# Patient Record
Sex: MALE | Race: WHITE | Employment: UNEMPLOYED | URBAN - METROPOLITAN AREA
[De-identification: names, ages, dates, MRNs, and addresses within clinical notes are randomized per-mention and may not be internally consistent; named-entity substitution may affect disease eponyms.]

---

## 2021-11-22 ENCOUNTER — APPOINTMENT (OUTPATIENT)
Dept: GENERAL RADIOLOGY | Age: 31
End: 2021-11-22
Attending: STUDENT IN AN ORGANIZED HEALTH CARE EDUCATION/TRAINING PROGRAM
Payer: COMMERCIAL

## 2021-11-22 ENCOUNTER — HOSPITAL ENCOUNTER (EMERGENCY)
Age: 31
Discharge: HOME HEALTH | End: 2021-11-22
Attending: STUDENT IN AN ORGANIZED HEALTH CARE EDUCATION/TRAINING PROGRAM
Payer: COMMERCIAL

## 2021-11-22 VITALS
WEIGHT: 85 LBS | HEIGHT: 60 IN | OXYGEN SATURATION: 100 % | DIASTOLIC BLOOD PRESSURE: 78 MMHG | HEART RATE: 92 BPM | SYSTOLIC BLOOD PRESSURE: 137 MMHG | BODY MASS INDEX: 16.69 KG/M2 | TEMPERATURE: 100 F | RESPIRATION RATE: 24 BRPM

## 2021-11-22 DIAGNOSIS — R50.9 FEVER, UNSPECIFIED FEVER CAUSE: Primary | ICD-10-CM

## 2021-11-22 LAB
COVID-19 RAPID TEST, COVR: NOT DETECTED
LACTATE BLD-SCNC: 3.67 MMOL/L (ref 0.4–2)
LIPASE SERPL-CCNC: 227 U/L (ref 73–393)
SOURCE, COVRS: NORMAL

## 2021-11-22 PROCEDURE — 83605 ASSAY OF LACTIC ACID: CPT

## 2021-11-22 PROCEDURE — 71045 X-RAY EXAM CHEST 1 VIEW: CPT

## 2021-11-22 PROCEDURE — 83690 ASSAY OF LIPASE: CPT

## 2021-11-22 PROCEDURE — 87635 SARS-COV-2 COVID-19 AMP PRB: CPT

## 2021-11-22 PROCEDURE — 99284 EMERGENCY DEPT VISIT MOD MDM: CPT

## 2021-11-23 NOTE — ED NOTES
EMERGENCY DEPARTMENT HISTORY AND PHYSICAL EXAM      Patient Name: Dmitry Martini    History of Presenting Illness     HPI:     79-year-old male with a history of intellectual disability, congenital blindness, HTN, scoliosis, cerebral palsy, epilepsy,  nonverbal at baseline, presents to the ED via EMS from a group home ([facility]) for evaluation of a fever, T-max 101 °F today. No changes to neurologic baseline per EMS report. He reportedly does not like being in beds and upon arrival to the ED he is writhing in the bed and appears uncomfortable. Patient is unable to provide us further history due to his medical condition. I called the group home to obtain more information who reports he has had a fever all day long and they were unable to control it with OTC antipyretics. I spoke with Jung Mendoza. She reports she is unable to access over his medications. PCP: No primary care provider on file. No current facility-administered medications on file prior to encounter. No current outpatient medications on file prior to encounter. Past History     Past Medical History:  Cerebral palsy, congenital blindness, HTN, epilepsy, intellectual disability    Past Surgical History:  Unknown, patient unable to provide due to medical condition    Family History:  Unknown, patient unable to provide due to medical condition    Social History:  Social History     Tobacco Use    Smoking status: Not on file    Smokeless tobacco: Not on file   Substance Use Topics    Alcohol use: Not on file    Drug use: Not on file       Allergies:  Not on File    Review of Nursing Notes: I have reviewed the relevant nursing notes that were available at the time of this entry.  Portions of the Family and Social history, as well as medications and allergies, may have been entered into my documentation by nursing or other ancillary staff; I have confirmed and may have supplemented that information to the best of my ability at the time the note was reviewed. There are some disagreements between the nursing notes and my evaluation at times. Some of the above referenced nursing documentation appears in my note after completion of my review. Review of Systems     Patient unable to provide due to medical condition. Physical Exam     General: In mild distress. Writhing around in bed. Head/Neck: Normocephalic, atraumatic. Nontender midline neck, normal ROM. EENT: PERRLA. EOM intact bilaterally. Cardiovascular: RRR, no murmurs, gallops, or rubs. Peripheral pulses normal and intact in BUE and BLE. Lungs/Chest: CTAB, no wheezing, rhonchi, or rales. Abdomen: No distention. No organomegaly. No rebound, rigidity, or guarding. Nontender. Extremities/Skin: Warm distal extremities No deformities. No edema. No rashes. Neuro: Moving all 4 extremities spontaneously, does have chronic contractures however. Nonverbal which is his baseline. Psych: Flat affect. Diagnostic Study Results     Labs -   No results found for this or any previous visit (from the past 12 hour(s)). Radiologic Studies -   XR CHEST PORT    (Results Pending)       Medical Decision Making     I am the first provider for this patient. Vital Signs- I personally reviewed and interpreted the patient's vital signs. No data found. Records Reviewed: I reviewed the patient's records to interpret any previous medical data available to me including EKGs, previous medical records, previous images, previous labs. Provider Notes (Medical Decision Making):     59-year-old male with a history of pathological disability, cerebral palsy, congenital blindness, presents to the ED via EMS for reported fever of 101 °F today at his group home. Reportedly was not controlled with antipyretics there and they called their physician who instructed them to transport him to the ER for further evaluation.      I discussed the case with Alicia Duran from her group home who reports there has been no other complaints or change in his behavior. Only the fever. Upon arrival, patient is nonverbal which is his baseline, writhing in bed which I am told he normally does. His vitals are normal and he is afebrile tonight at 99.7 °F.  Not tachycardic, tachypneic, or hypoxic. Blood pressure is normal.  Will obtain COVID-19/influenza swabs and discharge him back to his group home.     Magali Syed,   Date: 11/22/2021

## 2021-11-23 NOTE — ED NOTES
Patient orders to return to sending facility- [facility]. Report called to Bette Tam LPN et advised patient is returning to care home. [facility] is transporting.

## 2021-11-23 NOTE — DISCHARGE INSTRUCTIONS
You were brought here by EMS today for a fever at your group home that was not controlled with fever reducing medication. Upon arrival here, your vital signs including your temperature were normal.  We did obtain a COVID-19 and influenza swab. You were at your baseline per the report we received therefore we will discharge her back home with anticipatory guidance. Please return to the emergency room immediately if your fever returns or does not improve, there are changes in behavior, nausea, vomiting, or diarrhea. Your COVID-19 test was negative.

## 2021-11-23 NOTE — ED NOTES
Patient is Level 2 ALEC due to number of staff needed to obtain initial assessment, IV, blood work, swabs, safety issues and 1:1 monitoring.

## 2021-11-23 NOTE — ED NOTES
Patient from local long term care facility. He was sent for fever reported from staff. He is agitated, unable to obtain IV without 5-6 staff at bedside to assist in holding. Patient is biting at dressings, pulling off monitors. Initial VS obtained. MD at bedside for evaluation. Patient is afebrile on arrival. Blood and covid swab ordered et sent to lab. Plan to send patient back to care home. 1:1 sitter at bedside for safety. Patient is not a candidate for physical restraints due to his movements/ special need situation.

## 2021-11-23 NOTE — ED NOTES
TRANSFER - OUT REPORT:    Verbal report given to Rashid Xavier LPN) on Verónica Bowels  being transferred to [place] ) for routine progression of care       Report consisted of patients Situation, Background, Assessment and   Recommendations(SBAR). Information from the following report(s) ED Summary was reviewed with the receiving nurse. Lines:       Opportunity for questions and clarification was provided.       Patient transported with:   [employer]

## 2022-03-28 ENCOUNTER — HOSPITAL ENCOUNTER (EMERGENCY)
Age: 32
Discharge: HOME | End: 2022-03-29
Attending: EMERGENCY MEDICINE
Payer: COMMERCIAL

## 2022-03-28 VITALS
RESPIRATION RATE: 20 BRPM | OXYGEN SATURATION: 100 % | HEIGHT: 60 IN | TEMPERATURE: 97.3 F | BODY MASS INDEX: 16.69 KG/M2 | HEART RATE: 83 BPM | WEIGHT: 85 LBS | DIASTOLIC BLOOD PRESSURE: 67 MMHG | SYSTOLIC BLOOD PRESSURE: 128 MMHG

## 2022-03-28 DIAGNOSIS — R31.9 HEMATURIA, UNSPECIFIED TYPE: Primary | ICD-10-CM

## 2022-03-28 LAB
ALBUMIN SERPL-MCNC: 2.8 G/DL (ref 3.4–5)
ALBUMIN/GLOB SERPL: 1.1 {RATIO} (ref 0.8–1.7)
ALP SERPL-CCNC: 85 U/L (ref 45–117)
ALT SERPL-CCNC: 37 U/L (ref 16–61)
ANION GAP SERPL CALC-SCNC: 5 MMOL/L (ref 3–18)
APPEARANCE UR: ABNORMAL
AST SERPL-CCNC: 30 U/L (ref 10–38)
BACTERIA URNS QL MICRO: ABNORMAL /HPF
BASOPHILS # BLD: 0.1 K/UL (ref 0–0.1)
BASOPHILS NFR BLD: 1 % (ref 0–2)
BILIRUB SERPL-MCNC: 1.1 MG/DL (ref 0.2–1)
BILIRUB UR QL: ABNORMAL
BUN SERPL-MCNC: 34 MG/DL (ref 7–18)
BUN/CREAT SERPL: 35 (ref 12–20)
CALCIUM SERPL-MCNC: 9.6 MG/DL (ref 8.5–10.1)
CHLORIDE SERPL-SCNC: 108 MMOL/L (ref 100–111)
CO2 SERPL-SCNC: 28 MMOL/L (ref 21–32)
COLOR UR: ABNORMAL
CREAT SERPL-MCNC: 0.97 MG/DL (ref 0.6–1.3)
DIFFERENTIAL METHOD BLD: ABNORMAL
EOSINOPHIL # BLD: 0.2 K/UL (ref 0–0.4)
EOSINOPHIL NFR BLD: 4 % (ref 0–5)
EPITH CASTS URNS QL MICRO: ABNORMAL /LPF (ref 0–5)
ERYTHROCYTE [DISTWIDTH] IN BLOOD BY AUTOMATED COUNT: 14 % (ref 11.6–14.5)
GLOBULIN SER CALC-MCNC: 2.5 G/DL (ref 2–4)
GLUCOSE SERPL-MCNC: 92 MG/DL (ref 74–99)
GLUCOSE UR STRIP.AUTO-MCNC: NEGATIVE MG/DL
HCT VFR BLD AUTO: 27.9 % (ref 36–48)
HGB BLD-MCNC: 10.3 G/DL (ref 13–16)
HGB UR QL STRIP: ABNORMAL
IMM GRANULOCYTES # BLD AUTO: 0 K/UL (ref 0–0.04)
IMM GRANULOCYTES NFR BLD AUTO: 0 % (ref 0–0.5)
KETONES UR QL STRIP.AUTO: NEGATIVE MG/DL
LEUKOCYTE ESTERASE UR QL STRIP.AUTO: ABNORMAL
LYMPHOCYTES # BLD: 1.9 K/UL (ref 0.9–3.6)
LYMPHOCYTES NFR BLD: 39 % (ref 21–52)
MCH RBC QN AUTO: 35.4 PG (ref 24–34)
MCHC RBC AUTO-ENTMCNC: 36.9 G/DL (ref 31–37)
MCV RBC AUTO: 95.9 FL (ref 78–100)
MONOCYTES # BLD: 0.5 K/UL (ref 0.05–1.2)
MONOCYTES NFR BLD: 11 % (ref 3–10)
NEUTS SEG # BLD: 2.2 K/UL (ref 1.8–8)
NEUTS SEG NFR BLD: 45 % (ref 40–73)
NITRITE UR QL STRIP.AUTO: POSITIVE
NRBC # BLD: 0 K/UL (ref 0–0.01)
NRBC BLD-RTO: 0 PER 100 WBC
PH UR STRIP: 8 [PH] (ref 5–8)
PLATELET # BLD AUTO: 153 K/UL (ref 135–420)
PMV BLD AUTO: 9.8 FL (ref 9.2–11.8)
POTASSIUM SERPL-SCNC: 4 MMOL/L (ref 3.5–5.5)
PROT SERPL-MCNC: 5.3 G/DL (ref 6.4–8.2)
PROT UR STRIP-MCNC: >300 MG/DL
RBC # BLD AUTO: 2.91 M/UL (ref 4.35–5.65)
RBC #/AREA URNS HPF: ABNORMAL /HPF (ref 0–5)
SODIUM SERPL-SCNC: 141 MMOL/L (ref 136–145)
SP GR UR REFRACTOMETRY: 1.01 (ref 1–1.03)
UROBILINOGEN UR QL STRIP.AUTO: 0.2 EU/DL (ref 0.2–1)
WBC # BLD AUTO: 4.9 K/UL (ref 4.6–13.2)
WBC URNS QL MICRO: ABNORMAL /HPF (ref 0–4)

## 2022-03-28 PROCEDURE — 80053 COMPREHEN METABOLIC PANEL: CPT

## 2022-03-28 PROCEDURE — 87077 CULTURE AEROBIC IDENTIFY: CPT

## 2022-03-28 PROCEDURE — 74011250636 HC RX REV CODE- 250/636: Performed by: EMERGENCY MEDICINE

## 2022-03-28 PROCEDURE — 87186 SC STD MICRODIL/AGAR DIL: CPT

## 2022-03-28 PROCEDURE — 96372 THER/PROPH/DIAG INJ SC/IM: CPT

## 2022-03-28 PROCEDURE — 99284 EMERGENCY DEPT VISIT MOD MDM: CPT

## 2022-03-28 PROCEDURE — 85025 COMPLETE CBC W/AUTO DIFF WBC: CPT

## 2022-03-28 PROCEDURE — 81001 URINALYSIS AUTO W/SCOPE: CPT

## 2022-03-28 PROCEDURE — 87086 URINE CULTURE/COLONY COUNT: CPT

## 2022-03-28 RX ORDER — LEVOFLOXACIN 750 MG/1
750 TABLET ORAL
Status: DISCONTINUED | OUTPATIENT
Start: 2022-03-28 | End: 2022-03-29 | Stop reason: HOSPADM

## 2022-03-28 RX ORDER — LEVOFLOXACIN 750 MG/1
750 TABLET ORAL DAILY
Qty: 7 TABLET | Refills: 0 | Status: SHIPPED | OUTPATIENT
Start: 2022-03-28 | End: 2022-03-29 | Stop reason: SDUPTHER

## 2022-03-28 RX ORDER — MIDAZOLAM HYDROCHLORIDE 1 MG/ML
2 INJECTION, SOLUTION INTRAMUSCULAR; INTRAVENOUS ONCE
Status: COMPLETED | OUTPATIENT
Start: 2022-03-28 | End: 2022-03-28

## 2022-03-28 RX ADMIN — MIDAZOLAM 2 MG: 1 INJECTION INTRAMUSCULAR; INTRAVENOUS at 17:27

## 2022-03-28 NOTE — ED PROVIDER NOTES
EMERGENCY DEPARTMENT HISTORY AND PHYSICAL EXAM  This was created with voice recognition software and transcription errors may be present. 4:07 PM  Date: 3/28/2022  Patient Name: Emily Ocampo    History of Presenting Illness     Chief Complaint:    History Provided By:     HPI: Emily Ocampo is a 28 y.o. male with profound autism spectrum disorder who presents with hematuria patient is nonverbal, 89 pounds lives in a group home and is brought in for hematuria. He is seen with his group home care provider notes symptoms been going on for about a day. History is limited. Patient is at his baseline mental status per group home provider. She is unsure what his actual diagnosis is. PCP: None      Past History     Past Medical History:  No past medical history on file. Past Surgical History:  No past surgical history on file. Family History:  No family history on file. Social History:  Social History     Tobacco Use    Smoking status: Not on file    Smokeless tobacco: Not on file   Substance Use Topics    Alcohol use: Not on file    Drug use: Not on file       Allergies: Allergies   Allergen Reactions    Erythromycin Unknown (comments)    Risperidone Unknown (comments)    Sulfisoxazole Unknown (comments)    Suprax [Cefixime] Unknown (comments)       Review of Systems     Review of Systems   Unable to perform ROS: Patient nonverbal     10 point review of systems otherwise negative unless noted in HPI. Physical Exam       Physical Exam  Constitutional:       Comments: 28-year-old gentleman  89 pounds seen the mattress has been placed on the floor he is curled up underneath a blanket moaning somewhat his baseline per nursing staff   HENT:      Head: Normocephalic. Nose: Nose normal.      Mouth/Throat:      Mouth: Mucous membranes are moist.   Eyes:      General:         Left eye: No discharge. Cardiovascular:      Rate and Rhythm: Normal rate. Pulses: Normal pulses.    Pulmonary: Effort: Pulmonary effort is normal.   Abdominal:      General: Abdomen is flat. Comments: PEG tube in place   Musculoskeletal:         General: Normal range of motion. Cervical back: Normal range of motion. Skin:     General: Skin is warm. Capillary Refill: Capillary refill takes less than 2 seconds. Neurological:      Comments: At baseline his exam is fairly limited he has no focal deficits at neuro baseline per facility nursing. Diagnostic Study Results     Vital Signs  EKG:  Labs: CBC is unremarkable UA noted for what appears to be UTI with large blood small bili but chemistry is normal positive nitrites and leukocyte Estrace MS unremarkable  Imaging:     Medical Decision Making     ED Course: Progress Notes, Reevaluation, and Consults:    I will be the provider of record for this patient. Provider Notes (Medical Decision Making): Presents with hematuria. Will continue with evaluation patient has autism spectrum disorder states he was already 9 pounds and baseline nonverbal.  Will give some Versed to help calm him so we can obtain blood work urine and vitals      Presents with hematuria replaced on antibiotics given his allergies Levaquin even with this risk seems like the best option. We will give a weeks worth of Levaquin and have follow-up with urology       Diagnosis     Clinical Impression: No diagnosis found.     Disposition:        Patient's Medications    No medications on file

## 2022-03-28 NOTE — ED NOTES
Group home staff sitting at pt bedside off and ambulated to ED waiting room. Contact information for pt status is [phone] Toño Reed LPN and head nurse at group home.     Charge nurse updated and sitter requested

## 2022-03-28 NOTE — ED TRIAGE NOTES
Pt arrived EMS from [facility] for blood in urine in brief starting yesterday.     Hx per pt printed report received from [facility]:  Profound intellectual Disability  Gastrostomy  Functionally blind

## 2022-03-28 NOTE — ED NOTES
Pt attempting to climb out of stretcher. ED mattress on floor for pt safety and comfort. Caregiver at bedside.       Pt mattress on floor in group home    Charge nurse and provider updated

## 2022-03-28 NOTE — ED NOTES
Bedside shift change report given to Gerard Dowd (oncoming nurse) by Ronny Ny (offgoing nurse). Report included the following information SBAR, Kardex, ED Summary, Intake/Output, MAR, Recent Results, Alarm Parameters  and Quality Measures.

## 2022-03-29 RX ORDER — LEVOFLOXACIN 750 MG/1
750 TABLET ORAL DAILY
Qty: 7 TABLET | Refills: 0 | Status: SHIPPED | OUTPATIENT
Start: 2022-03-29 | End: 2022-04-05

## 2022-03-29 RX ORDER — LEVOFLOXACIN 750 MG/1
750 TABLET ORAL DAILY
Qty: 7 TABLET | Refills: 0 | Status: SHIPPED | OUTPATIENT
Start: 2022-03-29 | End: 2022-03-29 | Stop reason: SDUPTHER

## 2022-03-29 NOTE — ED NOTES
RN attempted again to access the patient's G tube in order to administer medication but the patient's pushed her arms away.

## 2022-03-29 NOTE — ED NOTES
Patient resting on ED mattress on the floor covering himself with his blanket. Patient's respirations are even and unlabored. Chest rise and fall present. Will continue to monitor patient.

## 2022-03-29 NOTE — ED NOTES
Patient was unable to comprehend RN's explanation of administering medication. Patient would not allow RN to assess or touch his G tube.

## 2022-03-29 NOTE — ED NOTES
Patient was discharged from ED with the [facility] medical transport team. The patient's discharge papers were given to [facility] staff. The patient was transported out of the ED via wheelchair with the [facility] team in stable condition.

## 2022-03-29 NOTE — ED NOTES
The facility that accepted the patient back called said that he did not get the prescription for the Levaquin. He would like it sent electronically to the [address] [place] [address]. I agreed to do that and modified the original prescription to be sent to the pharmacy and I was otherwise not involved in this patient's care.  Bia Amanda,  10:27 AM

## 2022-03-29 NOTE — ED NOTES
Patient sitting on ED mattress which is on the floor with his blanket covering him rocking back and forth. Patient appeared agitated but RN was able to calm him down.

## 2022-03-31 LAB
BACTERIA SPEC CULT: ABNORMAL
CC UR VC: ABNORMAL
SERVICE CMNT-IMP: ABNORMAL

## 2022-04-01 NOTE — ED NOTES
Microbiology for the urine showed resistance to Levaquin which was chosen due to the patient's allergies.   I called the facility and left a voicemail for them to call I was unable to reach anyone they should be calling back to the ER to get updated prescriptions

## 2022-06-09 ENCOUNTER — TELEPHONE (OUTPATIENT)
Dept: INTERNAL MEDICINE CLINIC | Age: 32
End: 2022-06-09

## 2022-06-09 ENCOUNTER — DOCUMENTATION ONLY (OUTPATIENT)
Dept: INTERNAL MEDICINE CLINIC | Age: 32
End: 2022-06-09

## 2022-06-09 ENCOUNTER — OFFICE VISIT (OUTPATIENT)
Dept: INTERNAL MEDICINE CLINIC | Age: 32
End: 2022-06-09
Payer: COMMERCIAL

## 2022-06-09 VITALS
OXYGEN SATURATION: 98 % | HEART RATE: 68 BPM | RESPIRATION RATE: 16 BRPM | SYSTOLIC BLOOD PRESSURE: 118 MMHG | DIASTOLIC BLOOD PRESSURE: 84 MMHG | TEMPERATURE: 98.2 F

## 2022-06-09 DIAGNOSIS — Z74.1 REQUIRES DAILY ASSISTANCE FOR ACTIVITIES OF DAILY LIVING (ADL) AND COMFORT NEEDS: ICD-10-CM

## 2022-06-09 DIAGNOSIS — M24.574 CONTRACTURE OF JOINT OF BOTH FEET: ICD-10-CM

## 2022-06-09 DIAGNOSIS — M72.0 DUPUYTREN'S CONTRACTURE OF BOTH HANDS: ICD-10-CM

## 2022-06-09 DIAGNOSIS — Z93.1 GASTROINTESTINAL TUBE PRESENT (HCC): ICD-10-CM

## 2022-06-09 DIAGNOSIS — N39.0 FREQUENT UTI: ICD-10-CM

## 2022-06-09 DIAGNOSIS — R56.9 SEIZURES (HCC): ICD-10-CM

## 2022-06-09 DIAGNOSIS — Z76.89 ENCOUNTER TO ESTABLISH CARE: Primary | ICD-10-CM

## 2022-06-09 DIAGNOSIS — J30.89 ENVIRONMENTAL AND SEASONAL ALLERGIES: ICD-10-CM

## 2022-06-09 DIAGNOSIS — R15.9 BOWEL AND BLADDER INCONTINENCE: ICD-10-CM

## 2022-06-09 DIAGNOSIS — M24.575 CONTRACTURE OF JOINT OF BOTH FEET: ICD-10-CM

## 2022-06-09 DIAGNOSIS — R47.01 NONVERBAL: ICD-10-CM

## 2022-06-09 DIAGNOSIS — R32 BOWEL AND BLADDER INCONTINENCE: ICD-10-CM

## 2022-06-09 DIAGNOSIS — H54.8 LEGALLY BLIND: ICD-10-CM

## 2022-06-09 PROCEDURE — 99204 OFFICE O/P NEW MOD 45 MIN: CPT | Performed by: NURSE PRACTITIONER

## 2022-06-09 RX ORDER — CHOLECALCIFEROL (VITAMIN D3) 125 MCG
5 CAPSULE ORAL
COMMUNITY

## 2022-06-09 RX ORDER — POLYETHYLENE GLYCOL 3350 17 G/17G
17 POWDER, FOR SOLUTION ORAL DAILY
COMMUNITY

## 2022-06-09 RX ORDER — CLONIDINE HYDROCHLORIDE 0.1 MG/1
TABLET ORAL 2 TIMES DAILY
COMMUNITY
End: 2022-06-13 | Stop reason: SDUPTHER

## 2022-06-09 RX ORDER — ZINC OXIDE/COD LIVER OIL 40 %
PASTE (GRAM) TOPICAL
COMMUNITY

## 2022-06-09 RX ORDER — CLONIDINE HYDROCHLORIDE 0.1 MG/1
TABLET ORAL
COMMUNITY

## 2022-06-09 RX ORDER — LORAZEPAM 2 MG/1
2 TABLET ORAL
COMMUNITY
End: 2022-07-05 | Stop reason: SDUPTHER

## 2022-06-09 RX ORDER — IBUPROFEN 400 MG/1
TABLET ORAL
COMMUNITY

## 2022-06-09 RX ORDER — LORAZEPAM 2 MG/1
TABLET ORAL
COMMUNITY
End: 2022-06-13 | Stop reason: SDUPTHER

## 2022-06-09 RX ORDER — GABAPENTIN 600 MG/1
600 TABLET ORAL 2 TIMES DAILY
COMMUNITY
End: 2022-10-11 | Stop reason: SDUPTHER

## 2022-06-09 RX ORDER — LORATADINE 10 MG/1
10 TABLET ORAL
COMMUNITY

## 2022-06-09 RX ORDER — DEXTROMETHORPHAN HYDROBROMIDE AND GUAIFENESIN 10; 200 MG/1; MG/1
CAPSULE, LIQUID FILLED ORAL
COMMUNITY

## 2022-06-09 RX ORDER — ELECTROLYTES/DEXTROSE
SOLUTION, ORAL ORAL
COMMUNITY

## 2022-06-09 RX ORDER — LANCETS 28 GAUGE
EACH MISCELLANEOUS AS NEEDED
COMMUNITY

## 2022-06-09 RX ORDER — FAMOTIDINE 20 MG/1
20 TABLET, FILM COATED ORAL DAILY
COMMUNITY

## 2022-06-09 NOTE — PROGRESS NOTES
Chief Complaint   Patient presents with   [facility]     1. \"Have you been to the ER, urgent care clinic since your last visit? Hospitalized since your last visit? \" n/a    2. \"Have you seen or consulted any other health care providers outside of the [address] [place] [address] since your last visit? \" No     3. For patients aged 39-70: Has the patient had a colonoscopy / FIT/ Cologuard? No      If the patient is female:    4. For patients aged 41-77: Has the patient had a mammogram within the past 2 years? No      5. For patients aged 21-65: Has the patient had a pap smear?  NA - based on age or sex

## 2022-06-09 NOTE — TELEPHONE ENCOUNTER
Pt stays at group home [facility] , his caregiver Tisha Iverson brought him in for a new pt visit pt does not have a picture ID or his insurance card , I was provided a face sheet with his insurance info ansd demographics.  Ok per J Carlos escobedo to accept    Face sheet scanned

## 2022-06-09 NOTE — PROGRESS NOTES
[facility] [address] [facility] [address]  [phone] [facility]/[phone] fax    6/9/2022    Denia Fernandez 1990 is a pleasant WHITE/NON- male. Resides at [address] [place] [address].      Past Medical History:   Diagnosis Date    Anxiety     Blind     Bowel and bladder incontinence 6/14/2022    Congenital blindness     Congenital deformity of eyelid     Contracture of ankle     bilateral    Contracture of joint of both feet 6/14/2022    Contracture of joint of both hands     Contracture of knee     bilateral    Dupuytren's contracture of both hands 6/14/2022    Eczema     Environmental and seasonal allergies 6/14/2022    Frequent UTI 6/14/2022    Gastrointestinal tube present ([address] 6/14/2022    Gastrostomy tube dependent (HCC)     GERD (gastroesophageal reflux disease)     Gynecomastia     bilateral    History of dental surgery     History of laser refractive surgery     Hyperammonemia ([address]     Legally blind 6/14/2022    Nonverbal 6/14/2022    Peter's anomaly     Profound intellectual disability     Requires daily assistance for activities of daily living (ADL) and comfort needs 6/14/2022    Scoliosis     Seizures ([address] 6/14/2022     Past Surgical History:   Procedure Laterality Date    HX MYRINGOTOMY       Current Outpatient Medications   Medication Sig    fluticasone propionate (Flonase Allergy Relief) 50 mcg/actuation nasal spray 2 Sprays by Both Nostrils route daily.  gabapentin (NEURONTIN) 600 mg tablet 600 mg by Per G Tube route three (3) times daily. Administer 1 and 1/2 tablets at bedtime to equal 900 mg for seizures    lactulose (CHRONULAC) 10 gram/15 mL solution by Per G Tube route daily. Administer 30 ml per g-tube daily (8 am)    cloNIDine HCL (CATAPRES) 0.1 mg tablet by Per G Tube route.  gabapentin (NEURONTIN) 600 mg tablet 600 mg by Per G Tube route two (2) times a day.  Administer 1 tablet in morning (8 am), and 1 tablet at noon for seizures    LORazepam (ATIVAN) 2 mg tablet 2 mg by Per G Tube route. Administer 1/2 tablet every morning (8 am), 1/2 tablet at noon, and 1 tablet at bedtime per G-TUBE for agitation.  melatonin 5 mg tablet Take 5 mg by mouth nightly. 1 tablet at bedtime per G-tube    nutritional supplements (Osmolite 1.5 Navi) 0.06 gram-1.5 kcal/mL liqd by Gastrostomy Tube route. Administer 1 can per G-TUBE 4 times daily (8am, 12 pm, 4 pm, 8 pm) for nutrition.  famotidine (Pepcid) 20 mg tablet 20 mg by Per G Tube route daily.  Ostomy Supplies (Stomahesive Protective) powd Apply  to affected area as needed.  ACETAMINOPHEN PO by Gastrostomy Tube route.  white petrolatum (Desitin Multi-Purpose) 71.3 % oint by Apply Externally route.  ibuprofen (MOTRIN) 400 mg tablet by Per G Tube route every six (6) hours as needed for Pain.  loratadine (CLARITIN) 10 mg tablet 10 mg by Per G Tube route.  Menthol/Camphor/Phen/Salicylic (LIP BALM MEDICATED EX) by Apply Externally route.  polyethylene glycol (Miralax) 17 gram/dose powder Take 17 g by mouth daily.  neomycin/bacitracin/polymyxinB (NEOSPORIN OP) Apply  to eye.  dextromethorphan-guaiFENesin (Robitussin Cough-Chest Anselmo DM)  mg cap by Gastrostomy Tube route. No current facility-administered medications for this visit. Allergies and Intolerances: Allergies   Allergen Reactions    Erythromycin Unknown (comments)    Risperidone Unknown (comments)    Sulfisoxazole Unknown (comments)    Suprax [Cefixime] Unknown (comments)     Family History:   History reviewed. No pertinent family history. Social History:   He  reports that he has never smoked. He has never used smokeless tobacco.   Social History     Substance and Sexual Activity   Alcohol Use None     Immunization History: There is no immunization history on file for this patient. Todays concerns/HPI:  1. Establish care. Previous PCP Dr. Wale Paez.   All information obtained is from the director of nursing, Josh Blanchard [facility] () and medical paperwork received by the DON. Patient arrived at [address] [place] [address] approximately December 2021. In scanned media see:  1. Home health certification and plan of care dated for      4/1/2022 through 9/30/2022  2. Part 5 plan for supports documentation      Patient is legally blind, nonverbal, unable to ambulate but can crawl and sit up on his knees. He is a total care patient. He requires assistance to transfer from his bed (which lays on the floor to help prevent falls) to his wheelchair. He has contractures of both hands and feet. Anxiety/agitation. He keeps his head covered with a blanket and chews on a sock for comfort. On multiple medications. He is n.p.o.  Gastric button [address] in place. Dr. Sheldon Rojas, GI ( office  fax) follow-up appointment in August 2022. Considering changing GI button to [address]. He receives Osmolite 1.5-calorie 4 times daily with 30 mL of water per meal.    GI/. History of frequent UTIs with hematuria. According to staff patient will hold his urine for many hours and then will relieve himself in his diaper. Currently being treated for UTI. He is incontinent of stool. Seizures. Has not had a seizure since December 2021. Sees Dr. Garcia Swann, neuro (). Currently under medication treatment. Review of Systems:  A comprehensive review of systems was negative except for that written in the HPI. Review of Data:  n/a    Physical:   Visit Vitals  /84   Pulse 68   Temp 98.2 °F (36.8 °C) (Temporal)   Resp 16   SpO2 98%      Wt Readings from Last 3 Encounters:   03/28/22 85 lb (38.6 kg)   11/22/21 85 lb (38.6 kg)       Exam:   Physical Exam  HENT:      Head: Normocephalic and atraumatic.       Ears:      Comments: Unable to assess  Eyes:      Comments: Eyes closed, unable to assess   Cardiovascular:      Rate and Rhythm: Normal rate and regular rhythm. Heart sounds: Normal heart sounds. No murmur heard. Pulmonary:      Effort: Pulmonary effort is normal. No respiratory distress. Breath sounds: Normal breath sounds. No wheezing. Abdominal:      General: Abdomen is flat. Bowel sounds are normal.      Palpations: Abdomen is soft. Comments: Button gastrostomy tube in place  Perirea intact   Musculoskeletal:         General: Deformity present. Right hand: Deformity (contractures) present. Decreased range of motion. Left hand: Deformity (contractures) present. Decreased range of motion. Cervical back: Normal range of motion and neck supple. Right foot: Decreased range of motion (contractures). Left foot: Decreased range of motion (contractures). Comments: Unable to test strength of hands. Assessed patient physically pull blanket from caregivers hands and placed over his head. Skin:     General: Skin is warm and dry. Neurological:      Mental Status: He is alert. Comments: Unable to assess   Psychiatric:      Comments: Unable to assess  Noted patient to become agitated when blanket not placed over his head. Once the blanket was placed over his head, agitation dissipated. There is no height or weight on file to calculate BMI. Unable to obtain height or weight due to patient's physical disabilities    Plan:      ICD-10-CM ICD-9-CM    1. Encounter to establish care  Z76.89 V65.8    2. Requires daily assistance for activities of daily living (ADL) and comfort needs  Z74.1 V49.89    3. Gastrointestinal tube present ([facility]  Z93.1 V44.1    4. Legally blind  H54.8 369.4    5. Nonverbal  R47.01 784.3    6. Frequent UTI  N39.0 599.0 REFERRAL TO UROLOGY   7. Bowel and bladder incontinence  R32 788.30     R15.9 787.60    8. Environmental and seasonal allergies  J30.89 477.8    9. Seizures (HCC)  R56.9 780.39    10. Dupuytren's contracture of both hands  M72.0 728.6    11. Contracture of joint of both feet  M24.574 718.47     M24.575       -Encounter to establish care  Reviewed available paperwork from the DON  See scanned media  Requested last office visit notes from Dr. Ary Heaton, [address] [place] [address] daily assistance for activities of daily living  Patient is a complete care and requires 24-hour assistance    -Gastrointestinal tube present (button)  Continue Osmolite therapy 4 times daily along with 30 mL of water per meal    -Legally blind/nonverbal    -Frequent UTI  I suspect this is from patient holding his urine and/for being exposed to wet adult undergarments. Patient to complete current antibiotic therapy  Referral placed to urology for consult    -Bowel/bladder incontinence    -Environmental and seasonal allergies  Continue nasal spray as prescribed    -Seizures  Continue current medication regimen as prescribed by neurologist  Specialist managed condition is being evaluated/managed by Dr. Valma Peabody. No acute findings today meriting change in management plan. -Contractures both hands/both feet  Monitor skin for breakdown  Prevent falls  Keep bed low to the ground  Assist with transfers    Reviewed medication and completed medication reconciliation with CARINA Ye. Reviewed side effects of medications.        Follow up 6 months      Dr. Titus Perez, AGNP-C, DNP  [facility] [address] [place] [address]

## 2022-06-13 RX ORDER — LACTULOSE 10 G/15ML
SOLUTION ORAL; RECTAL DAILY
COMMUNITY

## 2022-06-13 RX ORDER — FLUTICASONE PROPIONATE 50 MCG
2 SPRAY, SUSPENSION (ML) NASAL DAILY
COMMUNITY

## 2022-06-13 RX ORDER — GABAPENTIN 600 MG/1
600 TABLET ORAL 3 TIMES DAILY
COMMUNITY
End: 2022-07-05 | Stop reason: SDUPTHER

## 2022-06-14 PROBLEM — M24.574: Status: ACTIVE | Noted: 2022-06-14

## 2022-06-14 PROBLEM — R47.01 NONVERBAL: Status: ACTIVE | Noted: 2022-06-14

## 2022-06-14 PROBLEM — R32 BOWEL AND BLADDER INCONTINENCE: Status: ACTIVE | Noted: 2022-06-14

## 2022-06-14 PROBLEM — Z93.1 GASTROINTESTINAL TUBE PRESENT (HCC): Status: ACTIVE | Noted: 2022-01-01

## 2022-06-14 PROBLEM — M72.0 DUPUYTREN'S CONTRACTURE OF BOTH HANDS: Status: ACTIVE | Noted: 2022-06-14

## 2022-06-14 PROBLEM — R56.9 SEIZURES (HCC): Status: ACTIVE | Noted: 2022-01-01

## 2022-06-14 PROBLEM — Z74.1 REQUIRES DAILY ASSISTANCE FOR ACTIVITIES OF DAILY LIVING (ADL) AND COMFORT NEEDS: Status: ACTIVE | Noted: 2022-06-14

## 2022-06-14 PROBLEM — H54.8 LEGALLY BLIND: Status: ACTIVE | Noted: 2022-06-14

## 2022-06-14 PROBLEM — R15.9 BOWEL AND BLADDER INCONTINENCE: Status: ACTIVE | Noted: 2022-01-01

## 2022-06-14 PROBLEM — Z93.1 GASTROINTESTINAL TUBE PRESENT (HCC): Status: ACTIVE | Noted: 2022-06-14

## 2022-06-14 PROBLEM — R32 BOWEL AND BLADDER INCONTINENCE: Status: ACTIVE | Noted: 2022-01-01

## 2022-06-14 PROBLEM — R56.9 SEIZURES (HCC): Status: ACTIVE | Noted: 2022-06-14

## 2022-06-14 PROBLEM — N39.0 FREQUENT UTI: Status: ACTIVE | Noted: 2022-06-14

## 2022-06-14 PROBLEM — M24.575: Status: ACTIVE | Noted: 2022-06-14

## 2022-06-14 PROBLEM — J30.89 ENVIRONMENTAL AND SEASONAL ALLERGIES: Status: ACTIVE | Noted: 2022-06-14

## 2022-06-14 PROBLEM — J30.89 ENVIRONMENTAL AND SEASONAL ALLERGIES: Status: ACTIVE | Noted: 2022-01-01

## 2022-06-14 PROBLEM — R15.9 BOWEL AND BLADDER INCONTINENCE: Status: ACTIVE | Noted: 2022-06-14

## 2022-07-05 DIAGNOSIS — R56.9 SEIZURES (HCC): Primary | ICD-10-CM

## 2022-07-05 RX ORDER — LORAZEPAM 2 MG/1
TABLET ORAL
Qty: 180 TABLET | Refills: 0 | Status: SHIPPED | OUTPATIENT
Start: 2022-07-05 | End: 2022-10-31 | Stop reason: SDUPTHER

## 2022-07-05 RX ORDER — GABAPENTIN 600 MG/1
TABLET ORAL
Qty: 315 TABLET | Refills: 0 | Status: SHIPPED | OUTPATIENT
Start: 2022-07-05 | End: 2022-10-11

## 2022-07-05 NOTE — TELEPHONE ENCOUNTER
[facility]  reports [facility] last [facility] date for Ativan as 6/5/22 for a 30 d/s & Neurontin as 5/30/22 for a 30 d/s. Last Visit: 6/9/22 with Dr. Guillaume Sr  Next Appointment: 12/1/22 with MD [facility]    Requested Prescriptions     Pending Prescriptions Disp Refills    LORazepam (ATIVAN) 2 mg tablet 180 Tablet 0     Sig: Administer 1/2 tablet every morning (8 am), 1/2 tablet at noon, and 1 tablet at bedtime per G-TUBE for agitation.     gabapentin (NEURONTIN) 600 mg tablet 315 Tablet 0     Sig: Administer 1 tablet in morning (8 am), 1 tablet at noon, and 1 and 1/2 tablets (equal 900 mg) at bedtime for seizures         For Pharmacy [id number] in place:    Recommendation Provided To:    Intervention Detail: New Rx: 2, reason: Patient Preference   Gap Closed?:    Intervention Accepted By:   Tita Reyes Time Spent (min): 10

## 2022-07-13 ENCOUNTER — TELEPHONE (OUTPATIENT)
Dept: INTERNAL MEDICINE CLINIC | Age: 32
End: 2022-07-13

## 2022-07-13 DIAGNOSIS — U07.1 COVID-19 VIRUS INFECTION: Primary | ICD-10-CM

## 2022-07-13 NOTE — TELEPHONE ENCOUNTER
Please keep pt well hydrated. May take tylenol for fevers. May take Vit C and zinc OTC as well. If pt becomes dehydrated (not wetting his adult undergarments) or his sx worsen, please have him evaluated at the ED.  Thank you

## 2022-07-14 NOTE — TELEPHONE ENCOUNTER
Patient's caretaker reached and made aware of message per Dr. Daysi Caba. Caretaker inquiring if you could send over liquid vitamin c and zinc to [facility], also what dosage of tylenol for the patient. Please advise.

## 2022-07-14 NOTE — TELEPHONE ENCOUNTER
Patient's caretaker reached and made aware he can purchase vit c and zinc liquid supplements  OTC, and tylenol can be taken 30 ML (2tbsp) Q6 hrs as indicated on the bottle. Mr. Leigh Oliveira verbalized understanding.

## 2022-07-14 NOTE — TELEPHONE ENCOUNTER
Liquid Vit C and Zinc are OTC. Not sure what the dosage is on the tylenol liquid bottle. Have him follow directions on the bottle.

## 2022-08-03 ENCOUNTER — DOCUMENTATION ONLY (OUTPATIENT)
Dept: INTERNAL MEDICINE CLINIC | Age: 32
End: 2022-08-03

## 2022-08-03 ENCOUNTER — TELEPHONE (OUTPATIENT)
Dept: INTERNAL MEDICINE CLINIC | Age: 32
End: 2022-08-03

## 2022-08-03 NOTE — TELEPHONE ENCOUNTER
Gaurang Lai  from [address] [place] [address] called asking if Dr Received and signed plan of care for the patient please advise  she states they need it today   [phone]

## 2022-08-03 NOTE — PROGRESS NOTES
Received home health certification orders for LPN services. Spoke with Oren, supervisor for [address] [place] [address].  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  Certification period 9/1/2022-2/28/23. CERT signed.

## 2022-08-03 NOTE — PROGRESS NOTES
Cert was signed for periods of 4/1/2022-9/30/2022. Today I receive another \"Home Health Cert\" for periods of 9/1/2022-2/28/2022. Also noted pts address on this cert to be [facility] but he resides at [facility]. Why does he need a HH cert if he resides at the facility where the LPN is employed? Where is the cert date overlapping? Need clarification please.

## 2022-08-09 ENCOUNTER — TELEPHONE (OUTPATIENT)
Dept: INTERNAL MEDICINE CLINIC | Age: 32
End: 2022-08-09

## 2022-08-09 NOTE — TELEPHONE ENCOUNTER
Kvng Bartlett with pt's residential home, [facility] stated     Pt's home health provider will be faxing a request for CMN for briefs (adult underwear)    She just wanted to give the provider \"heads up\"

## 2022-08-22 ENCOUNTER — TELEPHONE (OUTPATIENT)
Dept: INTERNAL MEDICINE CLINIC | Age: 32
End: 2022-08-22

## 2022-08-31 ENCOUNTER — TELEPHONE (OUTPATIENT)
Dept: INTERNAL MEDICINE CLINIC | Age: 32
End: 2022-08-31

## 2022-08-31 NOTE — TELEPHONE ENCOUNTER
Sasha with [address] called, states they faxed over a Certificate of Medical Necessity on 8/22/22 and are calling to see if we received it and to check on the status. She can be reached at [phone].   Please advise, thank you

## 2022-09-02 NOTE — TELEPHONE ENCOUNTER
[address] and informed them that the last certificate of medical necessity was sent on 8/9/2022. Representative states she would refax document over, informed her Dr. Benz Todd would be back in the office next week.

## 2022-09-07 NOTE — TELEPHONE ENCOUNTER
Spoke with Rylie at [address] [place] [address]. Informed I have signed previous order requests and feel they send repeat order requests. She gave fax number to send most recent orders from 8/15/22 as she states they never rec'd the orders.  (Orders for diapers)

## 2022-09-27 ENCOUNTER — TELEPHONE (OUTPATIENT)
Dept: INTERNAL MEDICINE CLINIC | Age: 32
End: 2022-09-27

## 2022-09-27 DIAGNOSIS — R39.9 UTI SYMPTOMS: Primary | ICD-10-CM

## 2022-09-27 NOTE — TELEPHONE ENCOUNTER
Checking on the status of the POC request ,she said it was faxed on Thursday 09/22  Phone [phone]  Fax- [phone]

## 2022-09-27 NOTE — TELEPHONE ENCOUNTER
Hernesto Michaud is calling from [facility] stating patient's urine has had a strong odor for the last few days. They are asking for an order to check his urine.

## 2022-09-28 NOTE — TELEPHONE ENCOUNTER
HOME HEALTH CERTIFICATION AND PLAN OF CARE CERTIFICATION PERIOD FROM 9/20/2022 TO 1/31/2023 SIGNED AND FAXED OVER TO [address] [place] [address].

## 2022-09-28 NOTE — TELEPHONE ENCOUNTER
Faxed order to the attention of Renee Russell. Spoke to [employer], the form that was sent has to be signed and sent back. Requested they refax the form.

## 2022-09-30 ENCOUNTER — HOSPITAL ENCOUNTER (EMERGENCY)
Age: 32
Discharge: HOME | End: 2022-09-30
Attending: EMERGENCY MEDICINE
Payer: MEDICAID

## 2022-09-30 VITALS
SYSTOLIC BLOOD PRESSURE: 128 MMHG | TEMPERATURE: 99 F | HEIGHT: 60 IN | RESPIRATION RATE: 20 BRPM | BODY MASS INDEX: 17.67 KG/M2 | OXYGEN SATURATION: 97 % | WEIGHT: 90 LBS | DIASTOLIC BLOOD PRESSURE: 79 MMHG | HEART RATE: 102 BPM

## 2022-09-30 DIAGNOSIS — N30.01 ACUTE CYSTITIS WITH HEMATURIA: Primary | ICD-10-CM

## 2022-09-30 LAB
ALBUMIN SERPL-MCNC: 2.4 G/DL (ref 3.4–5)
ALBUMIN/GLOB SERPL: 0.9 {RATIO} (ref 0.8–1.7)
ALP SERPL-CCNC: 84 U/L (ref 45–117)
ALT SERPL-CCNC: 30 U/L (ref 16–61)
ANION GAP SERPL CALC-SCNC: 4 MMOL/L (ref 3–18)
APPEARANCE UR: CLEAR
AST SERPL-CCNC: 32 U/L (ref 10–38)
BACTERIA URNS QL MICRO: ABNORMAL /HPF
BASOPHILS # BLD: 0.1 K/UL (ref 0–0.1)
BASOPHILS NFR BLD: 1 % (ref 0–2)
BILIRUB SERPL-MCNC: 0.9 MG/DL (ref 0.2–1)
BILIRUB UR QL: NEGATIVE
BUN SERPL-MCNC: 42 MG/DL (ref 7–18)
BUN/CREAT SERPL: 29 (ref 12–20)
CALCIUM SERPL-MCNC: 9.2 MG/DL (ref 8.5–10.1)
CHLORIDE SERPL-SCNC: 108 MMOL/L (ref 100–111)
CO2 SERPL-SCNC: 26 MMOL/L (ref 21–32)
COLOR UR: YELLOW
CREAT SERPL-MCNC: 1.44 MG/DL (ref 0.6–1.3)
DIFFERENTIAL METHOD BLD: ABNORMAL
EOSINOPHIL # BLD: 0.3 K/UL (ref 0–0.4)
EOSINOPHIL NFR BLD: 5 % (ref 0–5)
EPITH CASTS URNS QL MICRO: ABNORMAL /LPF (ref 0–5)
ERYTHROCYTE [DISTWIDTH] IN BLOOD BY AUTOMATED COUNT: 14.7 % (ref 11.6–14.5)
GLOBULIN SER CALC-MCNC: 2.7 G/DL (ref 2–4)
GLUCOSE SERPL-MCNC: 86 MG/DL (ref 74–99)
GLUCOSE UR STRIP.AUTO-MCNC: NEGATIVE MG/DL
HCT VFR BLD AUTO: 27 % (ref 36–48)
HGB BLD-MCNC: 10 G/DL (ref 13–16)
HGB UR QL STRIP: ABNORMAL
IMM GRANULOCYTES # BLD AUTO: 0 K/UL (ref 0–0.04)
IMM GRANULOCYTES NFR BLD AUTO: 0 % (ref 0–0.5)
KETONES UR QL STRIP.AUTO: NEGATIVE MG/DL
LEUKOCYTE ESTERASE UR QL STRIP.AUTO: NEGATIVE
LYMPHOCYTES # BLD: 1.5 K/UL (ref 0.9–3.6)
LYMPHOCYTES NFR BLD: 27 % (ref 21–52)
MCH RBC QN AUTO: 35.6 PG (ref 24–34)
MCHC RBC AUTO-ENTMCNC: 37 G/DL (ref 31–37)
MCV RBC AUTO: 96.1 FL (ref 78–100)
MONOCYTES # BLD: 0.6 K/UL (ref 0.05–1.2)
MONOCYTES NFR BLD: 11 % (ref 3–10)
NEUTS SEG # BLD: 3.1 K/UL (ref 1.8–8)
NEUTS SEG NFR BLD: 57 % (ref 40–73)
NITRITE UR QL STRIP.AUTO: NEGATIVE
NRBC # BLD: 0 K/UL (ref 0–0.01)
NRBC BLD-RTO: 0 PER 100 WBC
PH UR STRIP: 7.5 [PH] (ref 5–8)
PLATELET # BLD AUTO: 153 K/UL (ref 135–420)
PMV BLD AUTO: 10.7 FL (ref 9.2–11.8)
POTASSIUM SERPL-SCNC: 4.3 MMOL/L (ref 3.5–5.5)
PROT SERPL-MCNC: 5.1 G/DL (ref 6.4–8.2)
PROT UR STRIP-MCNC: 300 MG/DL
RBC # BLD AUTO: 2.81 M/UL (ref 4.35–5.65)
RBC #/AREA URNS HPF: ABNORMAL /HPF (ref 0–5)
SODIUM SERPL-SCNC: 138 MMOL/L (ref 136–145)
SP GR UR REFRACTOMETRY: 1.01 (ref 1–1.03)
UROBILINOGEN UR QL STRIP.AUTO: 1 EU/DL (ref 0.2–1)
WBC # BLD AUTO: 5.5 K/UL (ref 4.6–13.2)
WBC URNS QL MICRO: ABNORMAL /HPF (ref 0–4)

## 2022-09-30 PROCEDURE — 94762 N-INVAS EAR/PLS OXIMTRY CONT: CPT

## 2022-09-30 PROCEDURE — 99284 EMERGENCY DEPT VISIT MOD MDM: CPT

## 2022-09-30 PROCEDURE — 96360 HYDRATION IV INFUSION INIT: CPT

## 2022-09-30 PROCEDURE — 81001 URINALYSIS AUTO W/SCOPE: CPT

## 2022-09-30 PROCEDURE — 96361 HYDRATE IV INFUSION ADD-ON: CPT

## 2022-09-30 PROCEDURE — 80053 COMPREHEN METABOLIC PANEL: CPT

## 2022-09-30 PROCEDURE — 85025 COMPLETE CBC W/AUTO DIFF WBC: CPT

## 2022-09-30 PROCEDURE — 87086 URINE CULTURE/COLONY COUNT: CPT

## 2022-09-30 PROCEDURE — 74011250636 HC RX REV CODE- 250/636: Performed by: PHYSICIAN ASSISTANT

## 2022-09-30 RX ORDER — NITROFURANTOIN 25; 75 MG/1; MG/1
100 CAPSULE ORAL 2 TIMES DAILY
Qty: 10 CAPSULE | Refills: 0 | Status: SHIPPED | OUTPATIENT
Start: 2022-09-30 | End: 2022-10-01 | Stop reason: ALTCHOICE

## 2022-09-30 RX ADMIN — SODIUM CHLORIDE 1000 ML: 9 INJECTION, SOLUTION INTRAVENOUS at 15:58

## 2022-09-30 NOTE — ED NOTES
Unable to obtain v/s recheck due to pt's becoming irritated. EMS transport arrived. D/c papers given.

## 2022-09-30 NOTE — ED PROVIDER NOTES
EMERGENCY DEPARTMENT HISTORY AND PHYSICAL EXAM        Date: 9/30/2022  Patient Name: Gilberto Lovett    History of Presenting Illness     Chief Complaint   Patient presents with    Blood in Urine       History Provided By: Patient    HPI: Gilberto Lovett, 28 y.o. male PMHx significant for congential blindness, Peter's anomaly, gynecomastia, scoliosis, GERD, anxiety, profound intellectual disability, seizures, Dupuytren's contracture presents via ambulance to the ED. Pt is nonverbal at baseline per group home. Pt lives at [place] [address]. Nurse at bedside and and reports patient has been acting normally. Patient receives tube feeds that she states he has been tolerating them normally. She reports night shift noted hematuria last night. She reports patient has a recurrent history of UTIs. Patient incontinent of urine at baseline but is on a Ledesma catheter in place. She states patient will typically sit up and yell when he is in pain or uncomfortable and he has not given any sign that he is comfortable. She states that the group home they are not able to straight cath without a doctor's order they brought him to ED to check for UTI. There are no other complaints, changes, or physical findings at this time. PCP: Jose Mtz DNP    No current facility-administered medications on file prior to encounter. Current Outpatient Medications on File Prior to Encounter   Medication Sig Dispense Refill    LORazepam (ATIVAN) 2 mg tablet Administer 1/2 tablet every morning (8 am), 1/2 tablet at noon, and 1 tablet at bedtime per G-TUBE for agitation. 180 Tablet 0    gabapentin (NEURONTIN) 600 mg tablet Administer 1 tablet in morning (8 am), 1 tablet at noon, and 1 and 1/2 tablets (equal 900 mg) at bedtime for seizures 315 Tablet 0    fluticasone propionate (Flonase Allergy Relief) 50 mcg/actuation nasal spray 2 Sprays by Both Nostrils route daily.       lactulose (CHRONULAC) 10 gram/15 mL solution by Per G Tube route daily. Administer 30 ml per g-tube daily (8 am)      cloNIDine HCL (CATAPRES) 0.1 mg tablet by Per G Tube route.      gabapentin (NEURONTIN) 600 mg tablet 600 mg by Per G Tube route two (2) times a day. Administer 1 tablet in morning (8 am), and 1 tablet at noon for seizures      melatonin 5 mg tablet Take 5 mg by mouth nightly. 1 tablet at bedtime per G-tube      nutritional supplements (Osmolite 1.5 Navi) 0.06 gram-1.5 kcal/mL liqd by Gastrostomy Tube route. Administer 1 can per G-TUBE 4 times daily (8am, 12 pm, 4 pm, 8 pm) for nutrition. famotidine (Pepcid) 20 mg tablet 20 mg by Per G Tube route daily. Ostomy Supplies (Stomahesive Protective) powd Apply  to affected area as needed. ACETAMINOPHEN PO by Gastrostomy Tube route. white petrolatum (Desitin Multi-Purpose) 71.3 % oint by Apply Externally route. ibuprofen (MOTRIN) 400 mg tablet by Per G Tube route every six (6) hours as needed for Pain.      loratadine (CLARITIN) 10 mg tablet 10 mg by Per G Tube route. Menthol/Camphor/Phen/Salicylic (LIP BALM MEDICATED EX) by Apply Externally route. polyethylene glycol (Miralax) 17 gram/dose powder Take 17 g by mouth daily. neomycin/bacitracin/polymyxinB (NEOSPORIN OP) Apply  to eye. dextromethorphan-guaiFENesin (Robitussin Cough-Chest Anselmo DM)  mg cap by Gastrostomy Tube route.          Past History     Past Medical History:  Past Medical History:   Diagnosis Date    Anxiety     Bowel and bladder incontinence 6/14/2022    Congenital blindness     Congenital deformity of eyelid     Contracture of joint of both feet 6/14/2022    Contracture of joint of both hands     Contracture of knee     bilateral    Dupuytren's contracture of both hands 6/14/2022    Eczema     Environmental and seasonal allergies 6/14/2022    Frequent UTI 6/14/2022    Gastrointestinal tube present ([address] 6/14/2022    GERD (gastroesophageal reflux disease)     Gynecomastia     bilateral    History of laser refractive surgery     Nonverbal 6/14/2022    Peter's anomaly     Profound intellectual disability     Requires daily assistance for activities of daily living (ADL) and comfort needs 6/14/2022    Scoliosis     Seizures ([address] 6/14/2022       Past Surgical History:  Past Surgical History:   Procedure Laterality Date    HX MYRINGOTOMY         Family History:  No family history on file. Social History:  Social History     Tobacco Use    Smoking status: Never    Smokeless tobacco: Never   Substance Use Topics    Drug use: Never       Allergies: Allergies   Allergen Reactions    Erythromycin Unknown (comments)    Risperidone Unknown (comments)    Sulfisoxazole Unknown (comments)    Suprax [Cefixime] Unknown (comments)         Review of Systems   Review of Systems   Constitutional:  Negative for chills and fever. HENT:  Negative for facial swelling. Eyes:  Negative for photophobia and visual disturbance. Respiratory:  Negative for shortness of breath. Cardiovascular:  Negative for chest pain. Gastrointestinal:  Negative for abdominal pain, nausea and vomiting. Genitourinary:  Positive for hematuria. Negative for flank pain. Skin:  Negative for color change, pallor, rash and wound. Neurological:  Negative for dizziness, weakness, light-headedness and headaches. All other systems reviewed and are negative. Physical Exam   Physical Exam  Vitals and nursing note reviewed. Constitutional:       General: He is not in acute distress. Appearance: He is well-developed. Comments: Pt in NAD  Nonverbal and contracted at baseline   HENT:      Head: Normocephalic and atraumatic. Eyes:      Conjunctiva/sclera: Conjunctivae normal.   Cardiovascular:      Rate and Rhythm: Normal rate and regular rhythm. Heart sounds: Normal heart sounds. Pulmonary:      Effort: Pulmonary effort is normal. No respiratory distress. Breath sounds: Normal breath sounds.    Abdominal:      General: Bowel sounds are normal. There is no distension. Palpations: Abdomen is soft. Comments: Abdomen nondistended  Does not contract to pain when abdomen palpated   Musculoskeletal:         General: Normal range of motion. Skin:     General: Skin is warm. Findings: No rash. Neurological:      Mental Status: He is alert and oriented to person, place, and time. Comments: Baseline mental status per nurse at bedside   Psychiatric:         Behavior: Behavior normal.       Diagnostic Study Results     Labs -     Recent Results (from the past 12 hour(s))   URINALYSIS W/ RFLX MICROSCOPIC    Collection Time: 09/30/22  1:05 PM   Result Value Ref Range    Color YELLOW      Appearance CLEAR      Specific gravity 1.012 1.005 - 1.030      pH (UA) 7.5 5.0 - 8.0      Protein 300 (A) NEG mg/dL    Glucose Negative NEG mg/dL    Ketone Negative NEG mg/dL    Bilirubin Negative NEG      Blood TRACE (A) NEG      Urobilinogen 1.0 0.2 - 1.0 EU/dL    Nitrites Negative NEG      Leukocyte Esterase Negative NEG     URINE MICROSCOPIC ONLY    Collection Time: 09/30/22  1:05 PM   Result Value Ref Range    WBC 1 to 4 0 - 4 /hpf    RBC 1 to 4 0 - 5 /hpf    Epithelial cells FEW 0 - 5 /lpf    Bacteria FEW (A) NEG /hpf   CBC WITH AUTOMATED DIFF    Collection Time: 09/30/22  1:20 PM   Result Value Ref Range    WBC 5.5 4.6 - 13.2 K/uL    RBC 2.81 (L) 4.35 - 5.65 M/uL    HGB 10.0 (L) 13.0 - 16.0 g/dL    HCT 27.0 (L) 36.0 - 48.0 %    MCV 96.1 78.0 - 100.0 FL    MCH 35.6 (H) 24.0 - 34.0 PG    MCHC 37.0 31.0 - 37.0 g/dL    RDW 14.7 (H) 11.6 - 14.5 %    PLATELET 781 523 - 264 K/uL    MPV 10.7 9.2 - 11.8 FL    NRBC 0.0 0  WBC    ABSOLUTE NRBC 0.00 0.00 - 0.01 K/uL    NEUTROPHILS 57 40 - 73 %    LYMPHOCYTES 27 21 - 52 %    MONOCYTES 11 (H) 3 - 10 %    EOSINOPHILS 5 0 - 5 %    BASOPHILS 1 0 - 2 %    IMMATURE GRANULOCYTES 0 0.0 - 0.5 %    ABS. NEUTROPHILS 3.1 1.8 - 8.0 K/UL    ABS. LYMPHOCYTES 1.5 0.9 - 3.6 K/UL    ABS.  MONOCYTES 0.6 0.05 - 1.2 K/UL    ABS. EOSINOPHILS 0.3 0.0 - 0.4 K/UL    ABS. BASOPHILS 0.1 0.0 - 0.1 K/UL    ABS. IMM. GRANS. 0.0 0.00 - 0.04 K/UL    DF AUTOMATED     METABOLIC PANEL, COMPREHENSIVE    Collection Time: 09/30/22  1:20 PM   Result Value Ref Range    Sodium 138 136 - 145 mmol/L    Potassium 4.3 3.5 - 5.5 mmol/L    Chloride 108 100 - 111 mmol/L    CO2 26 21 - 32 mmol/L    Anion gap 4 3.0 - 18 mmol/L    Glucose 86 74 - 99 mg/dL    BUN 42 (H) 7.0 - 18 MG/DL    Creatinine 1.44 (H) 0.6 - 1.3 MG/DL    BUN/Creatinine ratio 29 (H) 12 - 20      GFR est AA >60 >60 ml/min/1.73m2    GFR est non-AA 57 (L) >60 ml/min/1.73m2    Calcium 9.2 8.5 - 10.1 MG/DL    Bilirubin, total 0.9 0.2 - 1.0 MG/DL    ALT (SGPT) 30 16 - 61 U/L    AST (SGOT) 32 10 - 38 U/L    Alk. phosphatase 84 45 - 117 U/L    Protein, total 5.1 (L) 6.4 - 8.2 g/dL    Albumin 2.4 (L) 3.4 - 5.0 g/dL    Globulin 2.7 2.0 - 4.0 g/dL    A-G Ratio 0.9 0.8 - 1.7         Radiologic Studies -   No orders to display     CT Results  (Last 48 hours)      None          CXR Results  (Last 48 hours)      None            Medical Decision Making   I am the first provider for this patient. I reviewed the vital signs, available nursing notes, past medical history, past surgical history, family history and social history. Vital Signs-Reviewed the patient's vital signs. Patient Vitals for the past 12 hrs:   Temp Pulse Resp BP SpO2   09/30/22 1231 99 °F (37.2 °C) (!) 102 20 128/79 97 %       Records Reviewed: Nursing Notes, Old Medical Records, Previous Radiology Studies, and Previous Laboratory Studies    Provider Notes (Medical Decision Making):   DDx: UTI, DEVIKA, Electrolyte abnormality    27 yo M who presents with hematuria x 1 day. Nonverbal at baseline. Currently mildly elevated. UA shows small amount of WBC in urine. Previous history of E. coli in urine. Will send urine culture and treat with Macrobid. Discussed with nurse at group home who is aware of treatment plan.  At time of discharge, pt non-toxic appearing in NAD. Pt stable for prompt outpatient follow-up with PCP 1 to 2 days. Patient given strict instructions to return if symptoms worsen. ED Course:   Initial assessment performed. The patients presenting problems have been discussed, and they are in agreement with the care plan formulated and outlined with them. I have encouraged them to ask questions as they arise throughout their visit. Discussed with attending, Dr Euna Cogan. Discussed slightly elevated Cr. He recommends giving fluids and pt stable for discharge home with treatment for possible UTI. Spoke with Gray Bosworth, nurse contact at patient's group home, [facility]. She discussed treatment plan and she is in agreement. Disposition:  3:59 PM  Discussed lab results with pt along with dx and treatment plan. Discussed importance of PCP and urology follow up. All questions answered. Pt voiced they understood. Return if sx worsen. PLAN:  1. Current Discharge Medication List        START taking these medications    Details   nitrofurantoin, macrocrystal-monohydrate, (Macrobid) 100 mg capsule Take 1 Capsule by mouth two (2) times a day for 5 days. Qty: 10 Capsule, Refills: 0  Start date: 9/30/2022, End date: 10/5/2022           2. Follow-up Information       Follow up With Specialties Details Why Contact Info    Derek Shields, [address] Nurse Practitioner Schedule an appointment as soon as possible for a visit in 1 day  [address] [facility] [address] [address] [phone]      [facility] EMERGENCY DEPT Emergency Medicine  As needed, If symptoms worsen [address]  [phone]    Urology of [facility]  Schedule an appointment as soon as possible for a visit in 1 day  [facility], [address] [place] [address]  [phone]          Return to ED if worse     Diagnosis     Clinical Impression:   1.  Acute cystitis with hematuria Attestations:    KIRK Haynes    Please note that this dictation was completed with [employer], the computer voice recognition software. Quite often unanticipated grammatical, syntax, homophones, and other interpretive errors are inadvertently transcribed by the computer software. Please disregard these errors. Please excuse any errors that have escaped final proofreading. Thank you.

## 2022-09-30 NOTE — ED TRIAGE NOTES
Pt arrived via EMS from [address] [place] [address] , per EMS pt has hx of severe Intellectual Disability pt was sent here due to urine is darker, hx of UTI. No change in mental status per care giver.

## 2022-10-01 LAB
BACTERIA SPEC CULT: NORMAL
SERVICE CMNT-IMP: NORMAL

## 2022-10-01 RX ORDER — LEVOFLOXACIN 750 MG/1
750 TABLET ORAL DAILY
Qty: 5 TABLET | Refills: 0 | Status: SHIPPED | OUTPATIENT
Start: 2022-10-01 | End: 2022-10-11 | Stop reason: ALTCHOICE

## 2022-10-01 NOTE — ED NOTES
4:48 PM  Pharmacy called, notes Macrobid cannot be placed in the G tube. Will change prescription to Levo 750 mg PO qday x 5 days, pending urine culture.

## 2022-10-05 ENCOUNTER — TELEPHONE (OUTPATIENT)
Dept: INTERNAL MEDICINE CLINIC | Age: 32
End: 2022-10-05

## 2022-10-05 NOTE — TELEPHONE ENCOUNTER
Rahul Doan, the director of nursing at [facility] called, states that Patient was seen at the ED a few days ago for a UTI, and is still experiencing a lot of pain upon urination. She states that patient is non-verbal but he starts hollering with urination, and he is almost done with the antibiotic. She is requesting if Dr. Emeli Yousif would be willing to prescribe pyridium? She can be reached at [phone].   Please advise, thank you

## 2022-10-06 NOTE — TELEPHONE ENCOUNTER
Karthik Courts reached and notified that they will need to follow up with urology since the patient is under their care for recurrent UTI's. She verbalized understanding.

## 2022-10-07 ENCOUNTER — TELEPHONE (OUTPATIENT)
Dept: INTERNAL MEDICINE CLINIC | Age: 32
End: 2022-10-07

## 2022-10-07 DIAGNOSIS — R45.1 AGITATION: Primary | ICD-10-CM

## 2022-10-07 NOTE — TELEPHONE ENCOUNTER
Juanita Painting, the director of nursing at [facility] called, states she called a few days ago regarding patient having urinary pain, but was referred to patient urologist.    She did reach out to them, but now patient is having behavioral problems. She states the past 3 or 4 days, patient has had violent outbursts and is physically hurting himself. They thought it might have been associated with the urinary pain, but he has been doing things like biting the metal poles in the shower and scratching himself. The nurses have had to do 3 or 4 incident reports so far. She is calling to see what they should do and can be reached at [phone].   Please advise, thank you

## 2022-10-07 NOTE — TELEPHONE ENCOUNTER
He has a prescription for Ativan. He is scheduled to take 1/2 tablet in the a.m., 1/2 tablet at noon and 1 tablet at bedtime. They may increase his a.m. Ativan to 1 tablet and continue the 1/2 tablet at noon and 1 tablet at bedtime. I have placed an order for psychiatry. He needs to become established as soon as possible with psychiatry so they can better manage his situation. Thank you    1.  Agitation    - REFERRAL TO PSYCHIATRY

## 2022-10-10 DIAGNOSIS — R56.9 SEIZURES (HCC): ICD-10-CM

## 2022-10-10 NOTE — TELEPHONE ENCOUNTER
Director at [facility], reached and made aware of message per Dr. Gage Padgett. Елена verbalized understanding. Informed her that I will be mailing over list of psych offices in area and they will need to establish care with a psychiatrist for medication management.

## 2022-10-11 RX ORDER — GABAPENTIN 600 MG/1
TABLET ORAL
Qty: 315 TABLET | Refills: 0 | Status: SHIPPED | OUTPATIENT
Start: 2022-10-11

## 2022-10-11 NOTE — TELEPHONE ENCOUNTER
Per Romayne Amsterdam with [facility], pt will run out of this medication today     Requested Prescriptions     Pending Prescriptions Disp Refills    gabapentin (NEURONTIN) 600 mg tablet [Pharmacy Med Name: GABAPENTIN 600 MG TABLET] 315 Tablet      Sig: TAKE 1 TABLET BY MOUTH IN THE MORNING AT 8AM, TAKE 1 TABLET AT NOON THEN 1 AND 1/2 TABLETS AT BEDTIME FOR SEIZURES

## 2022-10-24 DIAGNOSIS — R56.9 SEIZURES (HCC): ICD-10-CM

## 2022-10-24 NOTE — TELEPHONE ENCOUNTER
Requested Prescriptions     Pending Prescriptions Disp Refills    LORazepam (ATIVAN) 2 mg tablet 180 Tablet 0     Sig: Administer 1/2 tablet every morning (8 am), 1/2 tablet at noon, and 1 tablet at bedtime per G-TUBE for agitation.    ,

## 2022-10-25 NOTE — TELEPHONE ENCOUNTER
Last Visit: 6/9/22 with Dr. Regis Barthel  Next Appointment: 12/1/22 with Dr. Regis Barthel  Previous Refill Encounter(s): 7/8/22 #180    Requested Prescriptions     Pending Prescriptions Disp Refills    LORazepam (ATIVAN) 2 mg tablet 180 Tablet 0     Sig: Administer 1/2 tablet every morning (8 am), 1/2 tablet at noon, and 1 tablet at bedtime per G-TUBE for agitation. For [facility] in place:   Recommendation Provided To:    Intervention Detail: New Rx: 1, reason: Patient Preference  Gap Closed?:   Intervention Accepted By:   Time Spent (min): 5

## 2022-10-26 RX ORDER — LORAZEPAM 2 MG/1
TABLET ORAL
Qty: 180 TABLET | Refills: 0 | OUTPATIENT
Start: 2022-10-26

## 2022-10-26 NOTE — TELEPHONE ENCOUNTER
Wing Sanders is calling from his residential home for this rx again. Stating this medication was prescribed by the doctor he was seeing prior to establishing with Dr. Leonila Sterling.

## 2022-10-26 NOTE — TELEPHONE ENCOUNTER
After reviewing  noted patient to be receiving his Ativan refills from [facility]. [facility] will also need to be responsible for adjusting any of his doses when it comes to the Ativan. On 7/5/2022 he received a refill of his Ativan 180 tablets from the on-call provider, Dr. Richa Cutler. He will need to return back to this provider to obtain all refills for his Ativan. I will not provide any refills for Ativan.

## 2022-10-28 DIAGNOSIS — R56.9 SEIZURES (HCC): ICD-10-CM

## 2022-10-28 RX ORDER — LORAZEPAM 2 MG/1
TABLET ORAL
Qty: 180 TABLET | Refills: 0 | Status: CANCELLED | OUTPATIENT
Start: 2022-10-28

## 2022-10-28 NOTE — TELEPHONE ENCOUNTER
Duplicate        For Pharmacy [facility] in place:   Recommendation Provided To:    Intervention Detail: Discontinued Rx: 1, reason: Duplicate Therapy  Gap Closed?:   Intervention Accepted By:   Time Spent (min): 5

## 2022-10-28 NOTE — TELEPHONE ENCOUNTER
Joseph Singleton  from [facility] called stating pt needs refill on his Lorazepam he is almost out of his meds

## 2022-10-31 ENCOUNTER — TELEPHONE (OUTPATIENT)
Dept: INTERNAL MEDICINE CLINIC | Age: 32
End: 2022-10-31

## 2022-10-31 RX ORDER — LORAZEPAM 2 MG/1
TABLET ORAL
Qty: 75 TABLET | Refills: 0 | Status: SHIPPED | OUTPATIENT
Start: 2022-10-31 | End: 2022-12-01 | Stop reason: SDUPTHER

## 2022-10-31 NOTE — TELEPHONE ENCOUNTER
Spoke with darby from pt's living facility and made aware of message from Dr. Jose C Rodriges. She states she is not aware of the last prescriber of the ativan and she will check back with living facility to confirm this is correct and call us back.

## 2022-10-31 NOTE — TELEPHONE ENCOUNTER
reached and made aware  does indicate pt is due for refill on the lorazapam 2mg. Frequency was increased to 2 1/2 tabs daily on 10/7/2022 by Dr. Loli Maurer for agitation. Per  and increased dose, patient will be out of lorazepam completely tomorrow and facility is requesting refill to at least when Dr. Loli Maurer returns.  Please advise

## 2022-11-01 ENCOUNTER — TELEPHONE (OUTPATIENT)
Dept: INTERNAL MEDICINE CLINIC | Age: 32
End: 2022-11-01

## 2022-11-01 NOTE — TELEPHONE ENCOUNTER
Referral was placed to [facility] back on 10/7. I will refax referral over to [facility] so they can call and set up appt.  Radha Marie states we can use her number on referral in order for appt to be virtual.

## 2022-11-01 NOTE — TELEPHONE ENCOUNTER
Trinidad Dickerson is calling from [facility] requesting a referral for Mr. Jake Candelario for [facility].

## 2022-11-08 DIAGNOSIS — J30.89 ENVIRONMENTAL AND SEASONAL ALLERGIES: Primary | ICD-10-CM

## 2022-11-08 NOTE — TELEPHONE ENCOUNTER
Requested Prescriptions     Pending Prescriptions Disp Refills    fluticasone propionate (FLONASE) 50 mcg/actuation nasal spray 1 Each      Si Sprays by Both Nostrils route daily.

## 2022-11-10 ENCOUNTER — TELEPHONE (OUTPATIENT)
Dept: INTERNAL MEDICINE CLINIC | Age: 32
End: 2022-11-10

## 2022-11-10 RX ORDER — FLUTICASONE PROPIONATE 50 MCG
2 SPRAY, SUSPENSION (ML) NASAL
Qty: 1 EACH | Refills: 5 | Status: SHIPPED | OUTPATIENT
Start: 2022-11-10

## 2022-11-10 NOTE — TELEPHONE ENCOUNTER
Requested Prescriptions     Signed Prescriptions Disp Refills    fluticasone propionate (FLONASE) 50 mcg/actuation nasal spray 1 Each 5     Si Sprays by Both Nostrils route daily as needed for Rhinitis.      Authorizing Provider: Cesar Booker

## 2022-11-10 NOTE — TELEPHONE ENCOUNTER
Hannah Diane is calling from [facility] stating patient is on formula and is down to his last two cartons. Shipment is supposed to come tomorrow. Stating the local supplier doesn't have 1.5 but they have 1.2. She is asking to use that and Ensure Plus as as an interim option in the event the shipment does not arrive on time.

## 2022-12-01 ENCOUNTER — OFFICE VISIT (OUTPATIENT)
Dept: INTERNAL MEDICINE CLINIC | Age: 32
End: 2022-12-01
Payer: MEDICAID

## 2022-12-01 VITALS
TEMPERATURE: 99 F | SYSTOLIC BLOOD PRESSURE: 134 MMHG | RESPIRATION RATE: 16 BRPM | DIASTOLIC BLOOD PRESSURE: 85 MMHG | HEART RATE: 74 BPM | OXYGEN SATURATION: 99 %

## 2022-12-01 DIAGNOSIS — R45.1 AGITATION: ICD-10-CM

## 2022-12-01 DIAGNOSIS — Z74.1 REQUIRES DAILY ASSISTANCE FOR ACTIVITIES OF DAILY LIVING (ADL) AND COMFORT NEEDS: ICD-10-CM

## 2022-12-01 DIAGNOSIS — Z93.1 GASTROINTESTINAL TUBE PRESENT (HCC): ICD-10-CM

## 2022-12-01 DIAGNOSIS — R56.9 SEIZURES (HCC): ICD-10-CM

## 2022-12-01 DIAGNOSIS — K59.00 CONSTIPATION, UNSPECIFIED CONSTIPATION TYPE: Primary | ICD-10-CM

## 2022-12-01 RX ORDER — ELECTROLYTES/DEXTROSE
SOLUTION, ORAL ORAL
Qty: 1 BOX | Refills: 11 | Status: SHIPPED | OUTPATIENT
Start: 2022-12-01

## 2022-12-01 RX ORDER — LORAZEPAM 2 MG/1
TABLET ORAL
Qty: 75 TABLET | Refills: 1 | Status: SHIPPED | OUTPATIENT
Start: 2022-12-01

## 2022-12-01 RX ORDER — LACTULOSE 10 G/15ML
SOLUTION ORAL; RECTAL 3 TIMES DAILY
Qty: 480 ML | Status: CANCELLED | OUTPATIENT
Start: 2022-12-01

## 2022-12-01 RX ORDER — LACTULOSE 10 G/15ML
20 SOLUTION ORAL EVERY MORNING
Qty: 240 ML | Refills: 11 | Status: SHIPPED | OUTPATIENT
Start: 2022-12-01

## 2022-12-01 RX ORDER — CLONIDINE HYDROCHLORIDE 0.1 MG/1
0.1 TABLET ORAL 3 TIMES DAILY
Qty: 270 TABLET | Refills: 3 | Status: SHIPPED | OUTPATIENT
Start: 2022-12-01

## 2022-12-01 NOTE — PROGRESS NOTES
Chief Complaint   Patient presents with    Follow-up     6 month f/u     1. \"Have you been to the ER, urgent care clinic since your last visit? Hospitalized since your last visit? \" No    2. \"Have you seen or consulted any other health care providers outside of the [address] [place] [address] since your last visit? \" No     3. For patients aged 39-70: Has the patient had a colonoscopy / FIT/ Cologuard? NA - based on age      If the patient is female:    4. For patients aged 41-77: Has the patient had a mammogram within the past 2 years? NA - based on age or sex      11. For patients aged 21-65: Has the patient had a pap smear?  NA - based on age or sex

## 2022-12-01 NOTE — PROGRESS NOTES
[facility] [id number] [address]  Tanja Hamm  [phone] YREPGW/[phone] fax    12/1/2022    Naima Clarke 1990 is a pleasant WHITE/NON- male. Resides at [address] [place] [address] since 12/2021. Todays concerns/HPI:  Accompanied by Davi Babcock LPN. Unable to offer much information as he has not worked with Jaki Rich in some time. Pts father visits often. Med refill request. Seeking lactulose refill. Pharmacy unable to obtain lactulose 10g/15ml. Restless night. Somewhat grumpy this morning. Patient is legally blind, nonverbal, unable to ambulate but can crawl and sit up on his knees. He is a total care patient. He requires assistance to transfer from his bed (which lays on the floor to help prevent falls) to his wheelchair. He has contractures of both hands and feet. Anxiety/agitation. He keeps his head covered with a blanket and chews on a sock for comfort. Has occasional outbursts/acting out. On multiple medications. Scheduled with psych 12/21/2022. He is n.p.o.  Gastric button [facility] in place. Dr. Marcella Reynolds, GI ( office  fax). He receives Osmolite 1.5-calorie 4 times daily with 30 mL of water per meal.     GI/. History of frequent UTIs with hematuria. According to staff patient will hold his urine for many hours and then will relieve himself in his diaper. recently treated for UTI Sept.  He is incontinent of stool. Seizures. Has not had a seizure since December 2021. Sees Dr. Brady Paez, neuro (). Currently under medication treatment.     Past Medical History:   Diagnosis Date    Anxiety     Bowel and bladder incontinence 6/14/2022    Congenital blindness     Congenital deformity of eyelid     Contracture of joint of both feet 6/14/2022    Contracture of joint of both hands     Contracture of knee     bilateral    Dupuytren's contracture of both hands 6/14/2022    Eczema Environmental and seasonal allergies 6/14/2022    Frequent UTI 6/14/2022    Gastrointestinal tube present ([address] 6/14/2022    GERD (gastroesophageal reflux disease)     Gynecomastia     bilateral    History of laser refractive surgery     Nonverbal 6/14/2022    Peter's anomaly     Profound intellectual disability     Requires daily assistance for activities of daily living (ADL) and comfort needs 6/14/2022    Scoliosis     Seizures ([address] 6/14/2022     Current Outpatient Medications   Medication Sig    ascorbic acid (VITAMIN C PO) Take  by mouth. Administer 1 packet of vitamin C mg with Vitamin D and Zinc mixed with 4 - 8oz of water daily. Up to 1 time a day. LORazepam (ATIVAN) 2 mg tablet Administer 1 tablet every morning (8 am), 1/2 tablet at noon, and 1 tablet at bedtime per G-TUBE for agitation. cloNIDine HCL (CATAPRES) 0.1 mg tablet 1 Tablet by Per G Tube route three (3) times daily. nutritional supplements (Osmolite 1.5 Navi) 0.06 gram-1.5 kcal/mL liqd Administer 1 can per G-TUBE 4 times daily (8am, 12 pm, 4 pm, 8 pm) for nutrition. lactulose (CHRONULAC) 20 gram/30 mL soln solution Take 30 mL by mouth Every morning. fluticasone propionate (FLONASE) 50 mcg/actuation nasal spray 2 Sprays by Both Nostrils route daily as needed for Rhinitis.    gabapentin (NEURONTIN) 600 mg tablet TAKE 1 TABLET BY MOUTH IN THE MORNING AT 8AM, TAKE 1 TABLET AT NOON THEN 1 AND 1/2 TABLETS AT BEDTIME FOR SEIZURES    cranberry fruit extract (cranberry extract) 250 mg capsule     melatonin 5 mg tablet Take 5 mg by mouth nightly. 1 tablet at bedtime per G-tube    famotidine (PEPCID) 20 mg tablet 20 mg by Per G Tube route daily. Ostomy Supplies powd Apply  to affected area as needed. ACETAMINOPHEN PO by Gastrostomy Tube route. white petrolatum (Desitin Multi-Purpose) 71.3 % oint by Apply Externally route.     ibuprofen (MOTRIN) 400 mg tablet by Per G Tube route every six (6) hours as needed for Pain.    loratadine (CLARITIN) 10 mg tablet 10 mg by Per G Tube route. Menthol/Camphor/Phen/Salicylic (LIP BALM MEDICATED EX) by Apply Externally route. polyethylene glycol (MIRALAX) 17 gram/dose powder Take 17 g by mouth daily. neomycin/bacitracin/polymyxinB (NEOSPORIN OP) Apply  to eye. dextromethorphan-guaiFENesin (Robitussin Cough-Chest Anselmo DM)  mg cap by Gastrostomy Tube route. No current facility-administered medications for this visit. Review of Systems:  Unable to assess      Physical:   Visit Vitals  /85   Pulse 74   Temp 99 °F (37.2 °C) (Temporal)   Resp 16   SpO2 99%      Wt Readings from Last 3 Encounters:   10/11/22 90 lb (40.8 kg)   09/30/22 90 lb (40.8 kg)   06/24/22 86 lb (39 kg)       Exam:   Physical Exam  HENT:      Head: Normocephalic and atraumatic. Ears:      Comments: Eyes closed     Mouth/Throat:      Mouth: Mucous membranes are moist.   Cardiovascular:      Rate and Rhythm: Normal rate and regular rhythm. Pulmonary:      Effort: Pulmonary effort is normal. No respiratory distress. Breath sounds: Normal breath sounds. No wheezing. Abdominal:      Comments: Peg button   Musculoskeletal:      Comments: Contractures to hands and feet  WC bound   Skin:     General: Skin is warm. Neurological:      Mental Status: He is alert. Review of Data:  N/a    Plan:    1. Constipation, unspecified constipation type  Refilled    - lactulose (CHRONULAC) 20 gram/30 mL soln solution; Take 30 mL by mouth Every morning. Dispense: 240 mL; Refill: 11    2. Seizures ([address]  Continue Gabapentin    Specialist managed condition is being evaluated/managed by Dr. Godfrey Alonzo. No acute findings today meriting change in management plan. 3. Agitation  Appt scheduled with psych 12/21/22  Psych to take over prescribing clonidine and Ativan    - LORazepam (ATIVAN) 2 mg tablet; Administer 1 tablet every morning (8 am), 1/2 tablet at noon, and 1 tablet at bedtime per G-TUBE for agitation. Dispense: 75 Tablet; Refill: 1  - cloNIDine HCL (CATAPRES) 0.1 mg tablet; 1 Tablet by Per G Tube route three (3) times daily. Dispense: 270 Tablet; Refill: 3    4. Gastrointestinal tube present ([address]    5. Requires daily assistance for activities of daily living (ADL) and comfort needs      Reviewed medication and completed medication reconciliation with the patient. Reviewed side effects of medications with the patient. Questions were answered and patient verb understanding. Past Surgical History:   Procedure Laterality Date    HX MYRINGOTOMY       Allergies and Intolerances: Allergies   Allergen Reactions    Erythromycin Unknown (comments)    Risperidone Unknown (comments)    Sulfisoxazole Unknown (comments)    Suprax [Cefixime] Unknown (comments)     Family History:   No family history on file. Social History:   He  reports that he has never smoked. He has never used smokeless tobacco.   Social History     Substance and Sexual Activity   Alcohol Use Never     Immunization History: There is no immunization history on file for this patient.       Follow up 1 year CPE      Dr. Anjana Huntley, TRISHP-C, DNP  [facility]

## 2022-12-05 ENCOUNTER — TELEPHONE (OUTPATIENT)
Dept: INTERNAL MEDICINE CLINIC | Age: 32
End: 2022-12-05

## 2022-12-05 RX ORDER — LACTULOSE 10 G/15ML
20 SOLUTION ORAL; RECTAL DAILY
Qty: 480 ML | Refills: 11 | Status: SHIPPED | OUTPATIENT
Start: 2022-12-05

## 2022-12-05 NOTE — TELEPHONE ENCOUNTER
Requested Prescriptions     Signed Prescriptions Disp Refills    lactulose (CHRONULAC) 10 gram/15 mL solution 480 mL 11     Sig: Take 30 mL by mouth daily.      Authorizing Provider: Zan Mallroy

## 2022-12-05 NOTE — TELEPHONE ENCOUNTER
[facility] with [facility]  re: Lactulose 20 gram/30mL      Stated they do not have that strength.  Asking if okay to change to:  Lactulose 10 gran.15mL quantity 480

## 2022-12-14 ENCOUNTER — TELEPHONE (OUTPATIENT)
Dept: INTERNAL MEDICINE CLINIC | Age: 32
End: 2022-12-14

## 2022-12-14 RX ORDER — LORATADINE 10 MG/1
5 TABLET ORAL
Qty: 30 TABLET | Refills: 11 | Status: SHIPPED | OUTPATIENT
Start: 2022-12-14

## 2022-12-14 RX ORDER — POLYETHYLENE GLYCOL 3350 17 G/17G
17 POWDER, FOR SOLUTION ORAL
Qty: 289 G | Refills: 11 | Status: SHIPPED | OUTPATIENT
Start: 2022-12-14

## 2022-12-14 RX ORDER — CRANBERRY FRUIT EXTRACT 250 MG
1 CAPSULE ORAL DAILY
Qty: 30 CAPSULE | Refills: 11 | Status: SHIPPED | OUTPATIENT
Start: 2022-12-14

## 2022-12-14 NOTE — TELEPHONE ENCOUNTER
Requested Prescriptions     Pending Prescriptions Disp Refills    polyethylene glycol (MIRALAX) 17 gram/dose powder       Sig: Take 17 g by mouth daily.     cranberry fruit extract (cranberry extract) 250 mg capsule      and pt has a gtube and is asking to get his cranberry extract in a tablet form NOT a capsule

## 2022-12-14 NOTE — TELEPHONE ENCOUNTER
Rakan calling from [facility] says they never got a copy of the change in Ativan for their records.  Says they need a copy of the original script with the new directions faxed to them at [phone]

## 2022-12-22 ENCOUNTER — DOCUMENTATION ONLY (OUTPATIENT)
Dept: INTERNAL MEDICINE CLINIC | Age: 32
End: 2022-12-22

## 2022-12-22 DIAGNOSIS — R45.1 AGITATION: ICD-10-CM

## 2022-12-22 RX ORDER — DEXTROMETHORPHAN HYDROBROMIDE AND GUAIFENESIN 10; 200 MG/1; MG/1
CAPSULE, LIQUID FILLED ORAL
Qty: 118 CAPSULE | Refills: 5 | Status: SHIPPED | OUTPATIENT
Start: 2022-12-22

## 2022-12-22 RX ORDER — ASCORBIC ACID 500 MG
TABLET ORAL
Qty: 90 TABLET | Refills: 11 | Status: SHIPPED | OUTPATIENT
Start: 2022-12-22

## 2022-12-22 RX ORDER — LORAZEPAM 2 MG/1
TABLET ORAL
Qty: 75 TABLET | Refills: 5 | Status: SHIPPED | OUTPATIENT
Start: 2022-12-22

## 2022-12-22 NOTE — PROGRESS NOTES
Received request for adult size brief from home care delivered. Reviewed last request/order from June 2022. They requested 5 adult size briefs per day. Today's order, they are requesting 8 adult size briefs per day. I believe this is in excess. Also received a request for a letter of medical necessity as insurance is denying the increase quantity of adult briefs requested by the group home. I believe 8/day is excessive. Will not supply letter of medical necessity.

## 2022-12-22 NOTE — PROGRESS NOTES
Rec'd request for meds. Printed RX and faxed to [facility]. Requested Prescriptions     Signed Prescriptions Disp Refills    dextromethorphan-guaiFENesin (Robitussin Cough-Chest Anselmo DM)  mg cap 118 Capsule 5     Sig: As needed on label as needed  Indications: cough    LORazepam (ATIVAN) 2 mg tablet 75 Tablet 5     Sig: Administer 1 tablet every morning (8 am), 1/2 tablet at noon, and 1 tablet at bedtime per G-TUBE for agitation. ascorbic acid, vitamin C, (VITAMIN C) 500 mg tablet 90 Tablet 11     Sig: Take  by ACTIVE Networkube.  Administer 1 packet of vitamin C mg with Vitamin D and Zinc mixed with 4 - 8oz of water daily as needed

## 2023-01-01 ENCOUNTER — HOME CARE VISIT (OUTPATIENT)
Age: 33
End: 2023-01-01
Payer: COMMERCIAL

## 2023-01-01 PROCEDURE — G0299 HHS/HOSPICE OF RN EA 15 MIN: HCPCS

## 2023-01-12 ENCOUNTER — TELEPHONE (OUTPATIENT)
Dept: INTERNAL MEDICINE CLINIC | Age: 33
End: 2023-01-12

## 2023-01-12 NOTE — TELEPHONE ENCOUNTER
Yamilet Zhou with [facility] request new RX.    Asking for tablets for cranberry fruit extract 250mg instead of capsules    Stated these work better with his G Tube      Send to [address]

## 2023-01-14 ENCOUNTER — HOSPITAL ENCOUNTER (EMERGENCY)
Age: 33
Discharge: HOME | End: 2023-01-14
Attending: EMERGENCY MEDICINE
Payer: MEDICAID

## 2023-01-14 VITALS
SYSTOLIC BLOOD PRESSURE: 147 MMHG | RESPIRATION RATE: 22 BRPM | DIASTOLIC BLOOD PRESSURE: 80 MMHG | OXYGEN SATURATION: 98 % | TEMPERATURE: 98.7 F | HEART RATE: 96 BPM

## 2023-01-14 DIAGNOSIS — T85.598A FEEDING TUBE DYSFUNCTION, INITIAL ENCOUNTER: Primary | ICD-10-CM

## 2023-01-14 PROCEDURE — 99283 EMERGENCY DEPT VISIT LOW MDM: CPT

## 2023-01-15 NOTE — ED NOTES
[facility] transport arrived to pick pt up and transport back to facility. VS updated and stable. 11:55 PM  01/14/23     Discharge instructions given to Care-Giver (name) with verbalization of understanding. Patient accompanied by Transport Team.  No prescriptions sent with pt. Patient discharged to home (destination).       Hannah Ortiz RN

## 2023-01-15 NOTE — ED NOTES
[facility] confirmed discharge transport with the crew arrival in approx 30-45 minutes, RN made aware.

## 2023-01-15 NOTE — ED NOTES
Pt resting on stretcher with eyes closed. Continues to play with \"Comfort\" socks. Equal chest rise/fall. Care-giver remains at bedside. Pending transport back to facility.

## 2023-01-15 NOTE — ED TRIAGE NOTES
Pt arrives via [facility] with reports of blood clots in tubing when checking for residual around 1630. Re-inserted Hector button. Pt is Blind and non-verbal at baseline- From [address]. Pt is awake with eyes closed. Lying on stretcher with his \"comfort socks\" while smelling/rubbing them on his face while rolling and unrolling them. Pt acting appropriately. Calm and cooperative. Hector button looks clean with no drainage around button. Past medical hx reviewed.        Past Medical History:   Diagnosis Date    Anxiety     Bowel and bladder incontinence 6/14/2022    Congenital blindness     Congenital deformity of eyelid     Contracture of joint of both feet 6/14/2022    Contracture of joint of both hands     Contracture of knee     bilateral    Dupuytren's contracture of both hands 6/14/2022    Eczema     Environmental and seasonal allergies 6/14/2022    Frequent UTI 6/14/2022    Gastrointestinal tube present ([address] 6/14/2022    GERD (gastroesophageal reflux disease)     Gynecomastia     bilateral    History of laser refractive surgery     Nonverbal 6/14/2022    Peter's anomaly     Profound intellectual disability     Requires daily assistance for activities of daily living (ADL) and comfort needs 6/14/2022    Scoliosis     Seizures ([address] 6/14/2022

## 2023-01-15 NOTE — ED NOTES
Paperwork provided to [employer]- to set up for transport back to facility. Care-giver at bedside drove separately. Pt and care-giver aware.

## 2023-01-15 NOTE — ED NOTES
Nurse from facility brought pt's personal connector piece for [employer] Attached connector and aspirated 40 mL gastric juices- all bile in nature- yellowish to light green in color. No blood/blood clots present. Caregiver at bedside also visualized tubing and did not see any blood/blood clots. MD aware. Hector Button flushed with 100 mL water. Connector piece wiped down, placed in Ziploc bag, and provided back to Caregiver at bedside.

## 2023-01-15 NOTE — ED PROVIDER NOTES
EMERGENCY DEPARTMENT HISTORY AND PHYSICAL EXAM    Date: 1/14/2023  Patient Name: Jadyn Smith    History of Presenting Illness     Chief Complaint   Patient presents with    Feeding Tube Problem         History Provided By: Patient, EMS, and Caregiver    Jadyn Smith is a 28 y.o. male with PMHX of multiple developmental issues, Gaurang Scio anomaly, requires enteral feeding through a G-tube who presents to the emergency department after his care facility noticed some blood clots in his residual when they checked. Per his attendant they were placed the GEMMA key port and sent him for evaluation. Patient is not able to contribute any history due to his mental state. PCP: Audrey Vieyra DNP    Current Outpatient Medications   Medication Sig Dispense Refill    dextromethorphan-guaiFENesin (Robitussin Cough-Chest Anselmo DM)  mg cap As needed on label as needed  Indications: cough 118 Capsule 5    LORazepam (ATIVAN) 2 mg tablet Administer 1 tablet every morning (8 am), 1/2 tablet at noon, and 1 tablet at bedtime per G-TUBE for agitation. 75 Tablet 5    ascorbic acid, vitamin C, (VITAMIN C) 500 mg tablet Take  by Gtube. Administer 1 packet of vitamin C mg with Vitamin D and Zinc mixed with 4 - 8oz of water daily as needed 90 Tablet 11    polyethylene glycol (MIRALAX) 17 gram/dose powder 17 g by Per G Tube route daily as needed for Constipation. 289 g 11    cranberry fruit extract (cranberry extract) 250 mg capsule Take 1 Capsule by mouth daily. 30 Capsule 11    loratadine (CLARITIN) 10 mg tablet 0.5 Tablets by Per G Tube route daily as needed for Allergies. 30 Tablet 11    lactulose (CHRONULAC) 10 gram/15 mL solution Take 30 mL by mouth daily. 480 mL 11    cloNIDine HCL (CATAPRES) 0.1 mg tablet 1 Tablet by Per G Tube route three (3) times daily. 270 Tablet 3    nutritional supplements (Osmolite 1.5 Navi) 0.06 gram-1.5 kcal/mL liqd Administer 1 can per G-TUBE 4 times daily (8am, 12 pm, 4 pm, 8 pm) for nutrition.  1 Box 11 fluticasone propionate (FLONASE) 50 mcg/actuation nasal spray 2 Sprays by Both Nostrils route daily as needed for Rhinitis. 1 Each 5    gabapentin (NEURONTIN) 600 mg tablet TAKE 1 TABLET BY MOUTH IN THE MORNING AT 8AM, TAKE 1 TABLET AT NOON THEN 1 AND 1/2 TABLETS AT BEDTIME FOR SEIZURES 315 Tablet 0    melatonin 5 mg tablet Take 5 mg by mouth nightly. 1 tablet at bedtime per G-tube      famotidine (PEPCID) 20 mg tablet 20 mg by Per G Tube route daily. Ostomy Supplies powd Apply  to affected area as needed. ACETAMINOPHEN PO by Gastrostomy Tube route. white petrolatum (Desitin Multi-Purpose) 71.3 % oint by Apply Externally route. ibuprofen (MOTRIN) 400 mg tablet by Per G Tube route every six (6) hours as needed for Pain. Menthol/Camphor/Phen/Salicylic (LIP BALM MEDICATED EX) by Apply Externally route. neomycin/bacitracin/polymyxinB (NEOSPORIN OP) Apply  to eye. Past History        Past Medical History:  Past Medical History:   Diagnosis Date    Anxiety     Bowel and bladder incontinence 6/14/2022    Congenital blindness     Congenital deformity of eyelid     Contracture of joint of both feet 6/14/2022    Contracture of joint of both hands     Contracture of knee     bilateral    Dupuytren's contracture of both hands 6/14/2022    Eczema     Environmental and seasonal allergies 6/14/2022    Frequent UTI 6/14/2022    Gastrointestinal tube present ([address] 6/14/2022    GERD (gastroesophageal reflux disease)     Gynecomastia     bilateral    History of laser refractive surgery     Nonverbal 6/14/2022    Peter's anomaly     Profound intellectual disability     Requires daily assistance for activities of daily living (ADL) and comfort needs 6/14/2022    Scoliosis     Seizures ([address] 6/14/2022       Past Surgical History:  Past Surgical History:   Procedure Laterality Date    HX MYRINGOTOMY         Family History:  No family history on file.     Social History:  Social History     Tobacco Use    Smoking status: Never    Smokeless tobacco: Never   Vaping Use    Vaping Use: Never used   Substance Use Topics    Alcohol use: Never    Drug use: Never       Allergies: Allergies   Allergen Reactions    Erythromycin Unknown (comments)    Risperidone Unknown (comments)    Sulfisoxazole Unknown (comments)    Suprax [Cefixime] Unknown (comments)         Review of Systems   Review of Systems  Unable to obtain due to patient's mental status. Physical Exam     Vitals:    01/14/23 2009   BP: (!) 151/83   Pulse: 78   Resp: 24   Temp: 98.7 °F (37.1 °C)   SpO2: 98%     Physical Exam    Nursing notes and vital signs reviewed     Constitutional: Non toxic appearing, no acute distress  Head: Atraumatic  Eyes: Closed and unwilling to open, patient is blind but movement is noted. Neck: Supple  Cardiovascular: Regular rate and rhythm  Chest: Normal work of breathing and chest excursion bilaterally  Lungs: Clear to ausculation bilaterally  Abdomen: Soft, non tender, non distended,, GEMMA key is in place no bleeding around the ostomy location, flushes easily, aspirates no blood noted. Scars consistent with surgical history of present and well-healed. back: No evidence of trauma or deformity  Extremities: No evidence of trauma or or deformities other than his congenital issues  Skin: Warm and dry, normal cap refill  Neuro: Baseline per his attendant. Patient is unable to interact in a meaningful manner to further determine. Generally sensation is intact and he does withdraw from noxious stimulus         Diagnostic Study Results     Labs -   No results found for this or any previous visit (from the past 12 hour(s)). Radiologic Studies -none indicated  No orders to display     CT Results  (Last 48 hours)      None          CXR Results  (Last 48 hours)      None            Medications given in the ED-  Medications - No data to display      Medical Decision Making   I am the first provider for this patient.     I reviewed the vital signs, available nursing notes, past medical history, past surgical history, family history and social history. Vital Signs-Reviewed the patient's vital signs. Pulse Oximetry Analysis - 98% on room air, not hypoxic     Cardiac Monitor:  Rate: 78 bpm  Rhythm: Normal sinus      Records Reviewed: Nursing Notes and Ambulance Run Sheet    Provider Notes (Medical Decision Making): Vivienne Orantes is a 28 y.o. male who was sent from his care home due to concerns about blood clots in his residual measurement from his gastric tube. I did not see any blood on aspiration here in the emergency department. There is no bleeding around this ostomy site. It flushes easily. No further work-up indicated at this time. Patient safe for discharge back to his care home. Procedures:  Procedures    ED Course:   As above    Diagnosis and Disposition     Critical Care: Not appropriate or applicable to this case    DISCHARGE NOTE:    Lamona Or  results have been reviewed with him. He has been counseled regarding his diagnosis, treatment, and plan. He verbally conveys understanding and agreement of the signs, symptoms, diagnosis, treatment and prognosis and additionally agrees to follow up as discussed. He also agrees with the care-plan and conveys that all of his questions have been answered. I have also provided discharge instructions for him that include: educational information regarding their diagnosis and treatment, and list of reasons why they would want to return to the ED prior to their follow-up appointment, should his condition change. He has been provided with education for proper emergency department utilization. CLINICAL IMPRESSION:    1. Feeding tube dysfunction, initial encounter        PLAN:  1. D/C to group home  2. Current Discharge Medication List        3.    Follow-up Information       Follow up With Specialties Details Why Contact Kendra Currie Nurse Jazmine Schedule an appointment as soon as possible for a visit  As needed [address] [facility]  [phone]      [facility] EMERGENCY DEPT Emergency Medicine Go to  As needed, If symptoms worsen [address]  [phone]          _______________________________      Please note that this dictation was completed with [employer], the computer voice recognition software. Quite often unanticipated grammatical, syntax, homophones, and other interpretive errors are inadvertently transcribed by the computer software. Please disregard these errors. Please excuse any errors that have escaped final proofreading.

## 2023-01-16 ENCOUNTER — TELEPHONE (OUTPATIENT)
Dept: INTERNAL MEDICINE CLINIC | Age: 33
End: 2023-01-16

## 2023-01-16 NOTE — TELEPHONE ENCOUNTER
I received a phone call over the weekend that he had blood clots out of his residual from his G-tube. There was some concern that he could have been in pain but he is blind and nonverbal.  As result, I instructed the staff to have him seen in ED for evaluation.     Dr. Iam Waters  [facility], [address] [place] [address], [facility]  Phone: [phone]  Fax: [phone]

## 2023-01-20 ENCOUNTER — OFFICE VISIT (OUTPATIENT)
Dept: INTERNAL MEDICINE CLINIC | Age: 33
End: 2023-01-20
Payer: MEDICAID

## 2023-01-20 VITALS — DIASTOLIC BLOOD PRESSURE: 82 MMHG | TEMPERATURE: 97.1 F | SYSTOLIC BLOOD PRESSURE: 144 MMHG

## 2023-01-20 DIAGNOSIS — N39.0 FREQUENT UTI: ICD-10-CM

## 2023-01-20 DIAGNOSIS — Z09 HOSPITAL DISCHARGE FOLLOW-UP: Primary | ICD-10-CM

## 2023-01-20 RX ORDER — LEVMETAMFETAMINE 50 MG
1 INHALER (EA) NASAL DAILY
Qty: 90 TABLET | Refills: 3 | Status: SHIPPED | OUTPATIENT
Start: 2023-01-20

## 2023-01-20 NOTE — ASSESSMENT & PLAN NOTE
Patient comes in today accompanied by Jewels Zamudio. He was seen in the ED for blood clots in his PEG tube. Jewels Zamudio states he is unsure as to why they did a residual check on this patient due to him having a button PEG as they do not routinely do residual checks. Since the ED visit they have not seen any bleeding nor do they do residual checks.   According to Jewels Zamudio patient is back to his normal.

## 2023-01-20 NOTE — PROGRESS NOTES
Chief Complaint   Patient presents with    ED Follow-up    UTI     Pt's nurse states he is showing signs of an UTI. 1. \"Have you been to the ER, urgent care clinic since your last visit? Hospitalized since your last visit? \"  Yes.    2. \"Have you seen or consulted any other health care providers outside of the [address] [place] [address] since your last visit? \" No     3. For patients aged 39-70: Has the patient had a colonoscopy / FIT/ Cologuard? NA - based on age      If the patient is female:    4. For patients aged 41-77: Has the patient had a mammogram within the past 2 years? NA - based on age or sex      11. For patients aged 21-65: Has the patient had a pap smear?  NA - based on age or sex

## 2023-01-20 NOTE — PROGRESS NOTES
Internists of [address] [place] [address] [facility], [address] [place] [address] Binu  [phone] [id number]/[phone] fax      1/20/2023    Reason for visit: Hospital follow up for   Chief Complaint   Patient presents with    ED Follow-up    UTI     Pt's nurse states he is showing signs of an UTI. Patient: Alfonso Alejo, 1990, [id number]       Primary MD: Deuce Duff DNP    Subjective:   Alfonso Alejo, a 28 y.o. male, who presents for hospital follow up for ED Follow-up and UTI (Pt's nurse states he is showing signs of an UTI.)    Past Medical History:   Diagnosis Date    Anxiety     Bowel and bladder incontinence 6/14/2022    Congenital blindness     Congenital deformity of eyelid     Contracture of joint of both feet 6/14/2022    Contracture of joint of both hands     Contracture of knee     bilateral    Dupuytren's contracture of both hands 6/14/2022    Eczema     Environmental and seasonal allergies 6/14/2022    Frequent UTI 6/14/2022    Gastrointestinal tube present ([address] 6/14/2022    GERD (gastroesophageal reflux disease)     Gynecomastia     bilateral    History of laser refractive surgery     Nonverbal 6/14/2022    Peter's anomaly     Profound intellectual disability     Requires daily assistance for activities of daily living (ADL) and comfort needs 6/14/2022    Scoliosis     Seizures ([address] 6/14/2022       Allergies   Allergen Reactions    Erythromycin Unknown (comments)    Risperidone Unknown (comments)    Sulfisoxazole Unknown (comments)    Suprax [Cefixime] Unknown (comments)       Current Outpatient Medications on File Prior to Visit   Medication Sig Dispense Refill    dextromethorphan-guaiFENesin (Robitussin Cough-Chest Anselmo DM)  mg cap As needed on label as needed  Indications: cough 118 Capsule 5    LORazepam (ATIVAN) 2 mg tablet Administer 1 tablet every morning (8 am), 1/2 tablet at noon, and 1 tablet at bedtime per G-TUBE for agitation.  75 Tablet 5    ascorbic acid, vitamin C, (VITAMIN C) 500 mg tablet Take  by Gtube. Administer 1 packet of vitamin C mg with Vitamin D and Zinc mixed with 4 - 8oz of water daily as needed 90 Tablet 11    polyethylene glycol (MIRALAX) 17 gram/dose powder 17 g by Per G Tube route daily as needed for Constipation. 289 g 11    loratadine (CLARITIN) 10 mg tablet 0.5 Tablets by Per G Tube route daily as needed for Allergies. 30 Tablet 11    lactulose (CHRONULAC) 10 gram/15 mL solution Take 30 mL by mouth daily. 480 mL 11    cloNIDine HCL (CATAPRES) 0.1 mg tablet 1 Tablet by Per G Tube route three (3) times daily. 270 Tablet 3    nutritional supplements (Osmolite 1.5 Navi) 0.06 gram-1.5 kcal/mL liqd Administer 1 can per G-TUBE 4 times daily (8am, 12 pm, 4 pm, 8 pm) for nutrition. 1 Box 11    fluticasone propionate (FLONASE) 50 mcg/actuation nasal spray 2 Sprays by Both Nostrils route daily as needed for Rhinitis. 1 Each 5    gabapentin (NEURONTIN) 600 mg tablet TAKE 1 TABLET BY MOUTH IN THE MORNING AT 8AM, TAKE 1 TABLET AT NOON THEN 1 AND 1/2 TABLETS AT BEDTIME FOR SEIZURES 315 Tablet 0    melatonin 5 mg tablet Take 5 mg by mouth nightly. 1 tablet at bedtime per G-tube      famotidine (PEPCID) 20 mg tablet 20 mg by Per G Tube route daily. Ostomy Supplies powd Apply  to affected area as needed. ACETAMINOPHEN PO by Gastrostomy Tube route. white petrolatum (Desitin Multi-Purpose) 71.3 % oint by Apply Externally route. ibuprofen (MOTRIN) 400 mg tablet by Per G Tube route every six (6) hours as needed for Pain. Menthol/Camphor/Phen/Salicylic (LIP BALM MEDICATED EX) by Apply Externally route. neomycin/bacitracin/polymyxinB (NEOSPORIN OP) Apply  to eye. [DISCONTINUED] cranberry fruit extract (cranberry extract) 250 mg capsule Take 1 Capsule by mouth daily. 30 Capsule 11     No current facility-administered medications on file prior to visit.         Objective:   Visit Vitals  BP (!) 144/82   Temp 97.1 °F (36.2 °C) (Temporal) Wt Readings from Last 3 Encounters:   10/11/22 90 lb (40.8 kg)   09/30/22 90 lb (40.8 kg)   06/24/22 86 lb (39 kg)       Physical Exam  Constitutional:       Comments: Unshaven face   Cardiovascular:      Rate and Rhythm: Normal rate and regular rhythm. Pulmonary:      Effort: Pulmonary effort is normal.      Breath sounds: Normal breath sounds. Psychiatric:      Comments: Quiet. Would not cooperate to get accurate blood pressure or pulse ox, heart rate. Assessment/Plan:    Aram Lee who has risk factors including (see above previous medical hx) and:   1. Hospital discharge follow-up  Assessment & Plan:  Patient comes in today accompanied by Sonam Mendoza. He was seen in the ED for blood clots in his PEG tube. Sonam Mendoza states he is unsure as to why they did a residual check on this patient due to him having a button PEG as they do not routinely do residual checks. Since the ED visit they have not seen any bleeding nor do they do residual checks. According to Sonam Mendoza patient is back to his normal.  2. Frequent UTI  Assessment & Plan:  Cristóbal Evans informs possible UTI. When asked what the symptoms were Cristóbal Evans states \"the nurses believe he scratched himself down there and he saw some blood. \"  Due to lack of UTI symptoms, strongly recommended they schedule patient to see urology and keep the appointments. Back in June patient saw urology and had 3 test scheduled for which patient no showed all 3. Patient is unable to care for himself so he missed appointments was due to staffing not keeping up with his appointments. Explained to Sonam Mendoza the importance of patient following up with urology and completing all testing. Sonam Mendoza requested cranberry extract capsules to be discontinued and tablets ordered as this is easier to crush and put into patient's PEG tube. Orders:  -     cranberry fruit extract (cranberry extract) 500 mg tab; Take 1 Tablet by mouth daily. , Normal, Disp-90 Tablet, R-3        Med reconciliation completed. Questions were answered. Written instructions followed our verbal discussion of all information discussed above. Patient expressed understanding of current diagnosis, planned testing, follow up and if needed to contact the office for any questions or concerns prior to the next visit. Keyon Milligan. CHIVO Orozco, DNP  Internist of [facility]    The total time was 22 minutes. Greater than 50% of that time was spent in counseling and/or coordination of care. My summary of patient counseling and coordination of care includes reviewing medical record, lab review, assessing patient, placing orders, and discussing plan of care with patient.

## 2023-01-20 NOTE — ASSESSMENT & PLAN NOTE
Noé Jackson informs possible UTI. When asked what the symptoms were Noé Jackson states \"the nurses believe he scratched himself down there and he saw some blood. \"  Due to lack of UTI symptoms, strongly recommended they schedule patient to see urology and keep the appointments. Back in June patient saw urology and had 3 test scheduled for which patient no showed all 3. Patient is unable to care for himself so he missed appointments was due to staffing not keeping up with his appointments. Explained to Denny Rodney the importance of patient following up with urology and completing all testing. Denny Rodney requested cranberry extract capsules to be discontinued and tablets ordered as this is easier to crush and put into patient's PEG tube.

## 2023-01-22 ENCOUNTER — APPOINTMENT (OUTPATIENT)
Dept: GENERAL RADIOLOGY | Age: 33
DRG: 469 | End: 2023-01-22
Attending: EMERGENCY MEDICINE
Payer: MEDICAID

## 2023-01-22 ENCOUNTER — HOSPITAL ENCOUNTER (INPATIENT)
Age: 33
LOS: 2 days | Discharge: HOME | DRG: 469 | End: 2023-01-24
Attending: EMERGENCY MEDICINE | Admitting: INTERNAL MEDICINE
Payer: MEDICAID

## 2023-01-22 ENCOUNTER — APPOINTMENT (OUTPATIENT)
Dept: CT IMAGING | Age: 33
DRG: 469 | End: 2023-01-22
Attending: EMERGENCY MEDICINE
Payer: MEDICAID

## 2023-01-22 DIAGNOSIS — D64.9 SYMPTOMATIC ANEMIA: ICD-10-CM

## 2023-01-22 DIAGNOSIS — N17.9 AKI (ACUTE KIDNEY INJURY) (HCC): Primary | ICD-10-CM

## 2023-01-22 DIAGNOSIS — Z51.5 HOSPICE CARE: ICD-10-CM

## 2023-01-22 LAB
ALBUMIN SERPL-MCNC: 2.2 G/DL (ref 3.4–5)
ALBUMIN/GLOB SERPL: 0.8 (ref 0.8–1.7)
ALP SERPL-CCNC: 71 U/L (ref 45–117)
ALT SERPL-CCNC: 31 U/L (ref 16–61)
ANION GAP SERPL CALC-SCNC: 7 MMOL/L (ref 3–18)
APPEARANCE UR: CLEAR
AST SERPL-CCNC: 22 U/L (ref 10–38)
B PERT DNA SPEC QL NAA+PROBE: NOT DETECTED
BACTERIA URNS QL MICRO: ABNORMAL /HPF
BASOPHILS # BLD: 0 K/UL (ref 0–0.1)
BASOPHILS NFR BLD: 1 % (ref 0–2)
BILIRUB SERPL-MCNC: 0.7 MG/DL (ref 0.2–1)
BILIRUB UR QL: NEGATIVE
BORDETELLA PARAPERTUSSIS PCR, BORPAR: NOT DETECTED
BUN SERPL-MCNC: 62 MG/DL (ref 7–18)
BUN/CREAT SERPL: 16 (ref 12–20)
C PNEUM DNA SPEC QL NAA+PROBE: NOT DETECTED
CALCIUM SERPL-MCNC: 8.8 MG/DL (ref 8.5–10.1)
CHLORIDE SERPL-SCNC: 110 MMOL/L (ref 100–111)
CO2 SERPL-SCNC: 25 MMOL/L (ref 21–32)
COLOR UR: YELLOW
CREAT SERPL-MCNC: 3.96 MG/DL (ref 0.6–1.3)
DIFFERENTIAL METHOD BLD: ABNORMAL
EOSINOPHIL # BLD: 0.3 K/UL (ref 0–0.4)
EOSINOPHIL NFR BLD: 5 % (ref 0–5)
EPITH CASTS URNS QL MICRO: ABNORMAL /LPF (ref 0–5)
ERYTHROCYTE [DISTWIDTH] IN BLOOD BY AUTOMATED COUNT: 15.5 % (ref 11.6–14.5)
FLUAV H1 2009 PAND RNA SPEC QL NAA+PROBE: NOT DETECTED
FLUAV H1 RNA SPEC QL NAA+PROBE: NOT DETECTED
FLUAV H3 RNA SPEC QL NAA+PROBE: NOT DETECTED
FLUAV SUBTYP SPEC NAA+PROBE: NOT DETECTED
FLUBV RNA SPEC QL NAA+PROBE: NOT DETECTED
FOLATE SERPL-MCNC: 15.8 NG/ML (ref 3.1–17.5)
GLOBULIN SER CALC-MCNC: 2.8 G/DL (ref 2–4)
GLUCOSE SERPL-MCNC: 123 MG/DL (ref 74–99)
GLUCOSE UR STRIP.AUTO-MCNC: NEGATIVE MG/DL
HADV DNA SPEC QL NAA+PROBE: NOT DETECTED
HCOV 229E RNA SPEC QL NAA+PROBE: NOT DETECTED
HCOV HKU1 RNA SPEC QL NAA+PROBE: NOT DETECTED
HCOV NL63 RNA SPEC QL NAA+PROBE: NOT DETECTED
HCOV OC43 RNA SPEC QL NAA+PROBE: NOT DETECTED
HCT VFR BLD AUTO: 18.4 % (ref 36–48)
HEMOCCULT STL QL: NEGATIVE
HGB BLD-MCNC: 6.6 G/DL (ref 13–16)
HGB UR QL STRIP: ABNORMAL
HISTORY CHECKED?,CKHIST: NORMAL
HMPV RNA SPEC QL NAA+PROBE: NOT DETECTED
HPIV1 RNA SPEC QL NAA+PROBE: NOT DETECTED
HPIV2 RNA SPEC QL NAA+PROBE: NOT DETECTED
HPIV3 RNA SPEC QL NAA+PROBE: NOT DETECTED
HPIV4 RNA SPEC QL NAA+PROBE: NOT DETECTED
IMM GRANULOCYTES # BLD AUTO: 0 K/UL (ref 0–0.04)
IMM GRANULOCYTES NFR BLD AUTO: 0 % (ref 0–0.5)
IRON SATN MFR SERPL: 57 % (ref 20–50)
IRON SERPL-MCNC: 127 UG/DL (ref 50–175)
KETONES UR QL STRIP.AUTO: NEGATIVE MG/DL
LACTATE BLD-SCNC: 1.98 MMOL/L (ref 0.4–2)
LEUKOCYTE ESTERASE UR QL STRIP.AUTO: NEGATIVE
LYMPHOCYTES # BLD: 1.3 K/UL (ref 0.9–3.6)
LYMPHOCYTES NFR BLD: 22 % (ref 21–52)
M PNEUMO DNA SPEC QL NAA+PROBE: NOT DETECTED
MCH RBC QN AUTO: 36.1 PG (ref 24–34)
MCHC RBC AUTO-ENTMCNC: 35.9 G/DL (ref 31–37)
MCV RBC AUTO: 100.5 FL (ref 78–100)
MONOCYTES # BLD: 0.5 K/UL (ref 0.05–1.2)
MONOCYTES NFR BLD: 9 % (ref 3–10)
NEUTS SEG # BLD: 3.8 K/UL (ref 1.8–8)
NEUTS SEG NFR BLD: 63 % (ref 40–73)
NITRITE UR QL STRIP.AUTO: NEGATIVE
NRBC # BLD: 0 K/UL (ref 0–0.01)
NRBC BLD-RTO: 0 PER 100 WBC
PH UR STRIP: 7 (ref 5–8)
PLATELET # BLD AUTO: 183 K/UL (ref 135–420)
PMV BLD AUTO: 10.3 FL (ref 9.2–11.8)
POTASSIUM SERPL-SCNC: 4.1 MMOL/L (ref 3.5–5.5)
PROT SERPL-MCNC: 5 G/DL (ref 6.4–8.2)
PROT UR STRIP-MCNC: >1000 MG/DL
RBC # BLD AUTO: 1.83 M/UL (ref 4.35–5.65)
RBC #/AREA URNS HPF: ABNORMAL /HPF (ref 0–5)
RETICS/RBC NFR AUTO: 6.9 % (ref 0.5–2.5)
RSV RNA SPEC QL NAA+PROBE: NOT DETECTED
RV+EV RNA SPEC QL NAA+PROBE: NOT DETECTED
SARS-COV-2 PCR, COVPCR: NOT DETECTED
SODIUM SERPL-SCNC: 142 MMOL/L (ref 136–145)
SP GR UR REFRACTOMETRY: 1.01 (ref 1–1.03)
TIBC SERPL-MCNC: 221 UG/DL (ref 250–450)
UROBILINOGEN UR QL STRIP.AUTO: 1 EU/DL (ref 0.2–1)
VIT B12 SERPL-MCNC: 1218 PG/ML (ref 211–911)
WBC # BLD AUTO: 6 K/UL (ref 4.6–13.2)
WBC URNS QL MICRO: ABNORMAL /HPF (ref 0–4)

## 2023-01-22 PROCEDURE — 86900 BLOOD TYPING SEROLOGIC ABO: CPT

## 2023-01-22 PROCEDURE — 81001 URINALYSIS AUTO W/SCOPE: CPT

## 2023-01-22 PROCEDURE — 86923 COMPATIBILITY TEST ELECTRIC: CPT

## 2023-01-22 PROCEDURE — 96372 THER/PROPH/DIAG INJ SC/IM: CPT

## 2023-01-22 PROCEDURE — 83605 ASSAY OF LACTIC ACID: CPT

## 2023-01-22 PROCEDURE — 87040 BLOOD CULTURE FOR BACTERIA: CPT

## 2023-01-22 PROCEDURE — 0202U NFCT DS 22 TRGT SARS-COV-2: CPT

## 2023-01-22 PROCEDURE — 65270000029 HC RM PRIVATE

## 2023-01-22 PROCEDURE — 85045 AUTOMATED RETICULOCYTE COUNT: CPT

## 2023-01-22 PROCEDURE — 96360 HYDRATION IV INFUSION INIT: CPT

## 2023-01-22 PROCEDURE — P9016 RBC LEUKOCYTES REDUCED: HCPCS

## 2023-01-22 PROCEDURE — 74176 CT ABD & PELVIS W/O CONTRAST: CPT

## 2023-01-22 PROCEDURE — 83540 ASSAY OF IRON: CPT

## 2023-01-22 PROCEDURE — 82607 VITAMIN B-12: CPT

## 2023-01-22 PROCEDURE — 96361 HYDRATE IV INFUSION ADD-ON: CPT

## 2023-01-22 PROCEDURE — 36430 TRANSFUSION BLD/BLD COMPNT: CPT

## 2023-01-22 PROCEDURE — 74011250636 HC RX REV CODE- 250/636: Performed by: EMERGENCY MEDICINE

## 2023-01-22 PROCEDURE — 99222 1ST HOSP IP/OBS MODERATE 55: CPT | Performed by: PHYSICIAN ASSISTANT

## 2023-01-22 PROCEDURE — 82272 OCCULT BLD FECES 1-3 TESTS: CPT

## 2023-01-22 PROCEDURE — 80053 COMPREHEN METABOLIC PANEL: CPT

## 2023-01-22 PROCEDURE — 71045 X-RAY EXAM CHEST 1 VIEW: CPT

## 2023-01-22 PROCEDURE — 99285 EMERGENCY DEPT VISIT HI MDM: CPT

## 2023-01-22 PROCEDURE — 85025 COMPLETE CBC W/AUTO DIFF WBC: CPT

## 2023-01-22 RX ORDER — ARIPIPRAZOLE 5 MG/1
5 TABLET ORAL DAILY
COMMUNITY
Start: 2023-01-12

## 2023-01-22 RX ORDER — SODIUM CHLORIDE 9 MG/ML
125 INJECTION, SOLUTION INTRAVENOUS CONTINUOUS
Status: DISCONTINUED | OUTPATIENT
Start: 2023-01-22 | End: 2023-01-22

## 2023-01-22 RX ORDER — SODIUM CHLORIDE 9 MG/ML
250 INJECTION, SOLUTION INTRAVENOUS AS NEEDED
Status: DISCONTINUED | OUTPATIENT
Start: 2023-01-22 | End: 2023-01-24 | Stop reason: HOSPADM

## 2023-01-22 RX ORDER — SODIUM CHLORIDE 0.9 % (FLUSH) 0.9 %
5-40 SYRINGE (ML) INJECTION AS NEEDED
Status: DISCONTINUED | OUTPATIENT
Start: 2023-01-22 | End: 2023-01-24 | Stop reason: HOSPADM

## 2023-01-22 RX ORDER — SODIUM CHLORIDE, SODIUM LACTATE, POTASSIUM CHLORIDE, CALCIUM CHLORIDE 600; 310; 30; 20 MG/100ML; MG/100ML; MG/100ML; MG/100ML
75 INJECTION, SOLUTION INTRAVENOUS CONTINUOUS
Status: DISCONTINUED | OUTPATIENT
Start: 2023-01-23 | End: 2023-01-24 | Stop reason: HOSPADM

## 2023-01-22 RX ORDER — LORAZEPAM 2 MG/ML
1 INJECTION INTRAMUSCULAR ONCE
Status: COMPLETED | OUTPATIENT
Start: 2023-01-22 | End: 2023-01-22

## 2023-01-22 RX ORDER — ACETAMINOPHEN 325 MG/1
650 TABLET ORAL
Status: DISCONTINUED | OUTPATIENT
Start: 2023-01-22 | End: 2023-01-24 | Stop reason: HOSPADM

## 2023-01-22 RX ORDER — POLYETHYLENE GLYCOL 3350 17 G/17G
17 POWDER, FOR SOLUTION ORAL DAILY PRN
Status: DISCONTINUED | OUTPATIENT
Start: 2023-01-22 | End: 2023-01-24 | Stop reason: HOSPADM

## 2023-01-22 RX ORDER — FACIAL-BODY WIPES
10 EACH TOPICAL DAILY PRN
Status: DISCONTINUED | OUTPATIENT
Start: 2023-01-22 | End: 2023-01-24 | Stop reason: HOSPADM

## 2023-01-22 RX ORDER — ARIPIPRAZOLE 5 MG/1
5 TABLET ORAL DAILY
Status: DISCONTINUED | OUTPATIENT
Start: 2023-01-23 | End: 2023-01-24 | Stop reason: HOSPADM

## 2023-01-22 RX ORDER — ACETAMINOPHEN 650 MG/1
650 SUPPOSITORY RECTAL
Status: DISCONTINUED | OUTPATIENT
Start: 2023-01-22 | End: 2023-01-24 | Stop reason: HOSPADM

## 2023-01-22 RX ORDER — LORAZEPAM 1 MG/1
1 TABLET ORAL 2 TIMES DAILY
Status: DISCONTINUED | OUTPATIENT
Start: 2023-01-23 | End: 2023-01-23

## 2023-01-22 RX ORDER — ONDANSETRON 2 MG/ML
4 INJECTION INTRAMUSCULAR; INTRAVENOUS
Status: DISCONTINUED | OUTPATIENT
Start: 2023-01-22 | End: 2023-01-24 | Stop reason: HOSPADM

## 2023-01-22 RX ORDER — SODIUM CHLORIDE 0.9 % (FLUSH) 0.9 %
5-40 SYRINGE (ML) INJECTION EVERY 8 HOURS
Status: DISCONTINUED | OUTPATIENT
Start: 2023-01-23 | End: 2023-01-24 | Stop reason: HOSPADM

## 2023-01-22 RX ORDER — GABAPENTIN 300 MG/1
600 CAPSULE ORAL 2 TIMES DAILY
Status: DISCONTINUED | OUTPATIENT
Start: 2023-01-23 | End: 2023-01-23

## 2023-01-22 RX ORDER — ONDANSETRON 4 MG/1
4 TABLET, ORALLY DISINTEGRATING ORAL
Status: DISCONTINUED | OUTPATIENT
Start: 2023-01-22 | End: 2023-01-24 | Stop reason: HOSPADM

## 2023-01-22 RX ADMIN — LORAZEPAM 1 MG: 2 INJECTION INTRAMUSCULAR; INTRAVENOUS at 21:18

## 2023-01-22 RX ADMIN — SODIUM CHLORIDE 500 ML: 9 INJECTION, SOLUTION INTRAVENOUS at 19:56

## 2023-01-22 RX ADMIN — SODIUM CHLORIDE 125 ML/HR: 9 INJECTION, SOLUTION INTRAVENOUS at 22:08

## 2023-01-22 NOTE — Clinical Note
Status[de-identified] INPATIENT [101]   Type of Bed: Telemetry [19]   Cardiac Monitoring Required?: Yes   Inpatient Hospitalization Certified Necessary for the Following Reasons: 3.  Patient receiving treatment that can only be provided in an inpatient setting (further clarification in H&P documentation)   Admitting Diagnosis: Anemia [558086]   Admitting Diagnosis: DEVIKA (acute kidney injury) [facility]) [5196064]   Admitting Physician: Tenzin Snowden   Attending Physician: Tenzin Snowden   Estimated Length of Stay: 2 Midnights   Discharge Plan[de-identified] Home with Office Follow-up

## 2023-01-22 NOTE — ED PROVIDER NOTES
EMERGENCY DEPARTMENT HISTORY AND PHYSICAL EXAM    6:58 PM      Date: 1/22/2023  Patient Name: Leah Kong    History of Presenting Illness     Chief Complaint   Patient presents with    Nasal Congestion         History Provided By: Caregiver  Location/Duration/Severity/Modifying factors   Patient is a 26-year-old male with history of electro disability, G-tube dependent, bowel incontinence, functionally blind, nonverbal, contractures, portal hypertension, hyperammonemia, portal vein thrombosis, presents emergency department with a complaint of increasing congestion for the last 2 days as well as foul-smelling urine and mild leakage around his Hector G-tube. History is provided by staff from the [address] [place] [address] this with the patient and the patient typically likes to lay down and has been having a difficult time because he gets more congested when he does so. The patient cannot provide a history based to the fact he is nonverbal.  Been no recent change in medications or sick contacts. The patient is not a smoker, drinker, nor drug user. The patient requires care at a group [place]. PCP: Zeferino Flowers DNP    Current Facility-Administered Medications   Medication Dose Route Frequency Provider Last Rate Last Admin    sodium chloride 0.9 % bolus infusion 500 mL  500 mL IntraVENous ONCE Kassidy Zechariah MACIEL  mL/hr at 01/22/23 1956 500 mL at 01/22/23 1956    0.9% sodium chloride infusion  125 mL/hr IntraVENous CONTINUOUS Radha Fritz MD        0.9% sodium chloride infusion 250 mL  250 mL IntraVENous PRN Radha Fritz MD         Current Outpatient Medications   Medication Sig Dispense Refill    cranberry fruit extract (cranberry extract) 500 mg tab Take 1 Tablet by mouth daily.  90 Tablet 3    dextromethorphan-guaiFENesin (Robitussin Cough-Chest Anselmo DM)  mg cap As needed on label as needed  Indications: cough 118 Capsule 5    LORazepam (ATIVAN) 2 mg tablet Administer 1 tablet every morning (8 am), 1/2 tablet at noon, and 1 tablet at bedtime per G-TUBE for agitation. 75 Tablet 5    ascorbic acid, vitamin C, (VITAMIN C) 500 mg tablet Take  by Gtube. Administer 1 packet of vitamin C mg with Vitamin D and Zinc mixed with 4 - 8oz of water daily as needed 90 Tablet 11    polyethylene glycol (MIRALAX) 17 gram/dose powder 17 g by Per G Tube route daily as needed for Constipation. 289 g 11    loratadine (CLARITIN) 10 mg tablet 0.5 Tablets by Per G Tube route daily as needed for Allergies. 30 Tablet 11    lactulose (CHRONULAC) 10 gram/15 mL solution Take 30 mL by mouth daily. 480 mL 11    cloNIDine HCL (CATAPRES) 0.1 mg tablet 1 Tablet by Per G Tube route three (3) times daily. 270 Tablet 3    nutritional supplements (Osmolite 1.5 Navi) 0.06 gram-1.5 kcal/mL liqd Administer 1 can per G-TUBE 4 times daily (8am, 12 pm, 4 pm, 8 pm) for nutrition. 1 Box 11    fluticasone propionate (FLONASE) 50 mcg/actuation nasal spray 2 Sprays by Both Nostrils route daily as needed for Rhinitis. 1 Each 5    gabapentin (NEURONTIN) 600 mg tablet TAKE 1 TABLET BY MOUTH IN THE MORNING AT 8AM, TAKE 1 TABLET AT NOON THEN 1 AND 1/2 TABLETS AT BEDTIME FOR SEIZURES 315 Tablet 0    melatonin 5 mg tablet Take 5 mg by mouth nightly. 1 tablet at bedtime per G-tube      famotidine (PEPCID) 20 mg tablet 20 mg by Per G Tube route daily. Ostomy Supplies powd Apply  to affected area as needed. ACETAMINOPHEN PO by Gastrostomy Tube route. white petrolatum (Desitin Multi-Purpose) 71.3 % oint by Apply Externally route. ibuprofen (MOTRIN) 400 mg tablet by Per G Tube route every six (6) hours as needed for Pain. Menthol/Camphor/Phen/Salicylic (LIP BALM MEDICATED EX) by Apply Externally route. neomycin/bacitracin/polymyxinB (NEOSPORIN OP) Apply  to eye.          Past History     Past Medical History:  Past Medical History:   Diagnosis Date    Anxiety     Bowel and bladder incontinence 6/14/2022    Congenital blindness     Congenital deformity of eyelid     Contracture of joint of both feet 6/14/2022    Contracture of joint of both hands     Contracture of knee     bilateral    Dupuytren's contracture of both hands 6/14/2022    Eczema     Environmental and seasonal allergies 6/14/2022    Frequent UTI 6/14/2022    Gastrointestinal tube present ([address] 6/14/2022    GERD (gastroesophageal reflux disease)     Gynecomastia     bilateral    History of laser refractive surgery     Nonverbal 6/14/2022    Peter's anomaly     Profound intellectual disability     Requires daily assistance for activities of daily living (ADL) and comfort needs 6/14/2022    Scoliosis     Seizures ([address] 6/14/2022       Past Surgical History:  Past Surgical History:   Procedure Laterality Date    HX MYRINGOTOMY         Family History:  History reviewed. No pertinent family history. Social History:  Social History     Tobacco Use    Smoking status: Never    Smokeless tobacco: Never   Vaping Use    Vaping Use: Never used   Substance Use Topics    Alcohol use: Never    Drug use: Never       Allergies: Allergies   Allergen Reactions    Erythromycin Unknown (comments)    Risperidone Unknown (comments)    Sulfisoxazole Unknown (comments)    Suprax [Cefixime] Unknown (comments)         Review of Systems       Review of Systems   Unable to perform ROS: Patient nonverbal       Physical Exam   Visit Vitals  BP (!) 137/101 (BP 1 Location: Right upper arm, BP Patient Position: Semi fowlers)   Pulse 75   Temp 97.4 °F (36.3 °C)   Resp 20   Ht 4' 10\" (1.473 m)   Wt 40.8 kg (90 lb)   SpO2 100%   BMI 18.81 kg/m²         Physical Exam  Vitals and nursing note reviewed. Constitutional:       General: He is not in acute distress. Appearance: He is well-developed. Comments: Eyes closed, nonverbal, nasal congestion   HENT:      Head: Normocephalic and atraumatic.       Right Ear: External ear normal.      Left Ear: External ear normal.      Nose:      Comments: Nasal crusting noted     Mouth/Throat:      Mouth: Mucous membranes are dry. Eyes:      General: No scleral icterus. Conjunctiva/sclera: Conjunctivae normal.      Pupils: Pupils are equal, round, and reactive to light. Neck:      Thyroid: No thyromegaly. Vascular: No JVD. Trachea: No tracheal deviation. Cardiovascular:      Rate and Rhythm: Normal rate and regular rhythm. Heart sounds: Normal heart sounds. No murmur heard. No friction rub. No gallop. Pulmonary:      Effort: Pulmonary effort is normal.      Breath sounds: Rhonchi present. Chest:      Chest wall: No tenderness. Abdominal:      General: Bowel sounds are normal. There is no distension. Palpations: Abdomen is soft. Tenderness: There is no abdominal tenderness. There is no guarding or rebound. Comments: Hector G-tube noted left upper quadrant with some mild irritation around with no active drainage   Musculoskeletal:         General: No tenderness. Cervical back: Normal range of motion and neck supple. Comments: Contractures noted   Lymphadenopathy:      Cervical: No cervical adenopathy. Skin:     General: Skin is warm and dry. Neurological:      Cranial Nerves: No cranial nerve deficit.       Coordination: Coordination normal.      Comments: Responds to verbal stimuli but does not speak, does not follow commands   Psychiatric:      Comments: Supportive staff in the group home at the bedside         Diagnostic Study Results     Labs -  Recent Results (from the past 12 hour(s))   CBC WITH AUTOMATED DIFF    Collection Time: 01/22/23  7:51 PM   Result Value Ref Range    WBC 6.0 4.6 - 13.2 K/uL    RBC 1.83 (L) 4.35 - 5.65 M/uL    HGB 6.6 (L) 13.0 - 16.0 g/dL    HCT 18.4 (L) 36.0 - 48.0 %    .5 (H) 78.0 - 100.0 FL    MCH 36.1 (H) 24.0 - 34.0 PG    MCHC 35.9 31.0 - 37.0 g/dL    RDW 15.5 (H) 11.6 - 14.5 %    PLATELET 324 620 - 629 K/uL    MPV 10.3 9.2 - 11.8 FL    NRBC 0.0 0  WBC ABSOLUTE NRBC 0.00 0.00 - 0.01 K/uL    NEUTROPHILS 63 40 - 73 %    LYMPHOCYTES 22 21 - 52 %    MONOCYTES 9 3 - 10 %    EOSINOPHILS 5 0 - 5 %    BASOPHILS 1 0 - 2 %    IMMATURE GRANULOCYTES 0 0.0 - 0.5 %    ABS. NEUTROPHILS 3.8 1.8 - 8.0 K/UL    ABS. LYMPHOCYTES 1.3 0.9 - 3.6 K/UL    ABS. MONOCYTES 0.5 0.05 - 1.2 K/UL    ABS. EOSINOPHILS 0.3 0.0 - 0.4 K/UL    ABS. BASOPHILS 0.0 0.0 - 0.1 K/UL    ABS. IMM. GRANS. 0.0 0.00 - 0.04 K/UL    DF AUTOMATED     METABOLIC PANEL, COMPREHENSIVE    Collection Time: 01/22/23  7:51 PM   Result Value Ref Range    Sodium 142 136 - 145 mmol/L    Potassium 4.1 3.5 - 5.5 mmol/L    Chloride 110 100 - 111 mmol/L    CO2 25 21 - 32 mmol/L    Anion gap 7 3.0 - 18 mmol/L    Glucose 123 (H) 74 - 99 mg/dL    BUN 62 (H) 7.0 - 18 MG/DL    Creatinine 3.96 (H) 0.6 - 1.3 MG/DL    BUN/Creatinine ratio 16 12 - 20      eGFR 20 (L) >60 ml/min/1.73m2    Calcium 8.8 8.5 - 10.1 MG/DL    Bilirubin, total 0.7 0.2 - 1.0 MG/DL    ALT (SGPT) 31 16 - 61 U/L    AST (SGOT) 22 10 - 38 U/L    Alk. phosphatase 71 45 - 117 U/L    Protein, total 5.0 (L) 6.4 - 8.2 g/dL    Albumin 2.2 (L) 3.4 - 5.0 g/dL    Globulin 2.8 2.0 - 4.0 g/dL    A-G Ratio 0.8 0.8 - 1.7     POC LACTIC ACID    Collection Time: 01/22/23  7:59 PM   Result Value Ref Range    Lactic Acid (POC) 1.98 0.40 - 2.00 mmol/L       Radiologic Studies -   XR CHEST SNGL V   Final Result   1. No acute cardiopulmonary process. Medical Decision Making   I am the first provider for this patient. I reviewed the vital signs, available nursing notes, past medical history, past surgical history, family history and social history. Vital Signs-Reviewed the patient's vital signs.       Records Reviewed: Nursing Notes, Old Medical Records, Previous Radiology Studies, and Previous Laboratory Studies (Time of Review: 6:58 PM)    ED Course: Progress Notes, Reevaluation, and Consults:    Patient has a hemoglobin of 6.6 and a rising creatinine from 4 months prior. We will discuss transfusion with the caregivers and the proper consent pathways for this patient. The patient has been having red drainage from his gastric tube and may have some GI bleeding as a source and will follow the patient's Hemoccult. Javad Fink, DO 8:48 PM    I discussed the case at length with Mitzi Leal patient's father and legal guardian and he has consented to blood transfusion and IV fluids. He does note that he does not want heroic measures for his son as his health has declined over the years. He does not want any heroics like life support or surgery at this point. He is agreeable to IV fluids, antibiotics, and blood if needed. Will continue to follow closely and update patient's guardian and caregivers. Provider Notes (Medical Decision Making):   Medical Decision Making  Patient is a 28-year-old male with a history of congenital abnormalities now total care dependent with gastrostomy tube as well as hepatic disease and chronic contractures that presents emergency department with some concentrated urine is foul-smelling per staff, increasing nasal congestion, and some leaking around the Hector G-tube. Patient has a G-tube and appears to be functional according to the staff only leaking around the site at times. There is no active leakage now and patient has no pain or distention. We will send a respiratory PCR, urinalysis, send blood cultures, viral hydrate as tolerated, chest x-ray, and then reevaluate. The G-tube likely need close outpatient follow-up as we are not able to replace button G-tubes in the emergency department I do not think it needs to be done emergently. Amount and/or Complexity of Data Reviewed  Independent Historian: caregiver  Labs: ordered. Radiology: ordered. Risk  Prescription drug management. Decision regarding hospitalization.         Procedures    Critical Care Time: Critical Care Time:  The services I provided to this patient were to treat and/or prevent clinically significant deterioration that could result in the failure of one or more body systems and/or organ systems due to anemia requiring transfusion, workup for progressive renal failure. Services included the following:  -reviewing nursing notes and old charts  -vital sign assessments  -direct patient care  -medication orders and management  -interpreting and reviewing diagnostic studies/labs  -re-evaluations  -documentation time    Aggregate critical care time was 33 minutes, which includes only time during which I was engaged in work directly related to the patient's care as described above, whether I was at bedside or elsewhere in the Emergency Department. It did not include time spent performing other reported procedures or the services of residents, students, nurses, or advance practice providers. Heather Finley, DO     Diagnosis     Clinical Impression:   1. DEVIKA (acute kidney injury) ([facility] [address]    2. Symptomatic anemia        Disposition: Admit    Follow-up Information    None          Patient's Medications   Start Taking    No medications on file   Continue Taking    ACETAMINOPHEN PO    by Gastrostomy Tube route. ASCORBIC ACID, VITAMIN C, (VITAMIN C) 500 MG TABLET    Take  by Gtube. Administer 1 packet of vitamin C mg with Vitamin D and Zinc mixed with 4 - 8oz of water daily as needed    CLONIDINE HCL (CATAPRES) 0.1 MG TABLET    1 Tablet by Per G Tube route three (3) times daily. CRANBERRY FRUIT EXTRACT (CRANBERRY EXTRACT) 500 MG TAB    Take 1 Tablet by mouth daily. DEXTROMETHORPHAN-GUAIFENESIN (ROBITUSSIN COUGH-CHEST KATRIN DM)  MG CAP    As needed on label as needed  Indications: cough    FAMOTIDINE (PEPCID) 20 MG TABLET    20 mg by Per G Tube route daily. FLUTICASONE PROPIONATE (FLONASE) 50 MCG/ACTUATION NASAL SPRAY    2 Sprays by Both Nostrils route daily as needed for Rhinitis.     GABAPENTIN (NEURONTIN) 600 MG TABLET    TAKE 1 TABLET BY MOUTH IN THE MORNING AT 8AM, TAKE 1 TABLET AT NOON THEN 1 AND 1/2 TABLETS AT BEDTIME FOR SEIZURES    IBUPROFEN (MOTRIN) 400 MG TABLET    by Per G Tube route every six (6) hours as needed for Pain. LACTULOSE (CHRONULAC) 10 GRAM/15 ML SOLUTION    Take 30 mL by mouth daily. LORATADINE (CLARITIN) 10 MG TABLET    0.5 Tablets by Per G Tube route daily as needed for Allergies. LORAZEPAM (ATIVAN) 2 MG TABLET    Administer 1 tablet every morning (8 am), 1/2 tablet at noon, and 1 tablet at bedtime per G-TUBE for agitation. MELATONIN 5 MG TABLET    Take 5 mg by mouth nightly. 1 tablet at bedtime per G-tube    MENTHOL/CAMPHOR/PHEN/SALICYLIC (LIP BALM MEDICATED EX)    by Apply Externally route. NEOMYCIN/BACITRACIN/POLYMYXINB (NEOSPORIN OP)    Apply  to eye. NUTRITIONAL SUPPLEMENTS (OSMOLITE 1.5 TORO) 0.06 GRAM-1.5 KCAL/ML LIQD    Administer 1 can per G-TUBE 4 times daily (8am, 12 pm, 4 pm, 8 pm) for nutrition. OSTOMY SUPPLIES POWD    Apply  to affected area as needed. POLYETHYLENE GLYCOL (MIRALAX) 17 GRAM/DOSE POWDER    17 g by Per G Tube route daily as needed for Constipation. WHITE PETROLATUM (DESITIN MULTI-PURPOSE) 71.3 % OINT    by Apply Externally route. These Medications have changed    No medications on file   Stop Taking    No medications on file     Disclaimer: Sections of this note are dictated using utilizing voice recognition software. Minor typographical errors may be present. If questions arise, please do not hesitate to contact me or call our department.

## 2023-01-23 PROBLEM — Q89.9 CONGENITAL ABNORMALITIES: Status: ACTIVE | Noted: 2023-01-01

## 2023-01-23 PROBLEM — K92.2 GIB (GASTROINTESTINAL BLEEDING): Status: ACTIVE | Noted: 2023-01-01

## 2023-01-23 PROBLEM — Q89.9 CONGENITAL ABNORMALITIES: Status: ACTIVE | Noted: 2023-01-23

## 2023-01-23 PROBLEM — F79 INTELLECTUAL DISABILITY: Status: ACTIVE | Noted: 2023-01-23

## 2023-01-23 PROBLEM — K92.2 GIB (GASTROINTESTINAL BLEEDING): Status: ACTIVE | Noted: 2023-01-23

## 2023-01-23 PROBLEM — Z71.89 GOALS OF CARE, COUNSELING/DISCUSSION: Status: ACTIVE | Noted: 2023-01-23

## 2023-01-23 PROBLEM — Z71.89 GOALS OF CARE, COUNSELING/DISCUSSION: Status: ACTIVE | Noted: 2023-01-01

## 2023-01-23 LAB
ABO + RH BLD: NORMAL
ALBUMIN SERPL-MCNC: 2.2 G/DL (ref 3.4–5)
ANION GAP SERPL CALC-SCNC: 6 MMOL/L (ref 3–18)
BLD PROD TYP BPU: NORMAL
BLOOD GROUP ANTIBODIES SERPL: NORMAL
BPU ID: NORMAL
BUN SERPL-MCNC: 59 MG/DL (ref 7–18)
BUN/CREAT SERPL: 16 (ref 12–20)
CALCIUM SERPL-MCNC: 8.4 MG/DL (ref 8.5–10.1)
CALLED TO:,BCALL1: NORMAL
CHLORIDE SERPL-SCNC: 112 MMOL/L (ref 100–111)
CO2 SERPL-SCNC: 24 MMOL/L (ref 21–32)
CREAT SERPL-MCNC: 3.62 MG/DL (ref 0.6–1.3)
CROSSMATCH RESULT,%XM: NORMAL
ERYTHROCYTE [DISTWIDTH] IN BLOOD BY AUTOMATED COUNT: 15.2 % (ref 11.6–14.5)
GLUCOSE SERPL-MCNC: 92 MG/DL (ref 74–99)
HCT VFR BLD AUTO: 23.8 % (ref 36–48)
HGB BLD-MCNC: 8.7 G/DL (ref 13–16)
MCH RBC QN AUTO: 35.1 PG (ref 24–34)
MCHC RBC AUTO-ENTMCNC: 36.6 G/DL (ref 31–37)
MCV RBC AUTO: 96 FL (ref 78–100)
NRBC # BLD: 0 K/UL (ref 0–0.01)
NRBC BLD-RTO: 0 PER 100 WBC
PHOSPHATE SERPL-MCNC: 4.2 MG/DL (ref 2.5–4.9)
PLATELET # BLD AUTO: 187 K/UL (ref 135–420)
PMV BLD AUTO: 9.7 FL (ref 9.2–11.8)
POTASSIUM SERPL-SCNC: 4.2 MMOL/L (ref 3.5–5.5)
RBC # BLD AUTO: 2.48 M/UL (ref 4.35–5.65)
SODIUM SERPL-SCNC: 142 MMOL/L (ref 136–145)
SPECIMEN EXP DATE BLD: NORMAL
STATUS OF UNIT,%ST: NORMAL
UNIT DIVISION, %UDIV: 0
WBC # BLD AUTO: 6.2 K/UL (ref 4.6–13.2)

## 2023-01-23 PROCEDURE — 99222 1ST HOSP IP/OBS MODERATE 55: CPT | Performed by: NURSE PRACTITIONER

## 2023-01-23 PROCEDURE — 94762 N-INVAS EAR/PLS OXIMTRY CONT: CPT

## 2023-01-23 PROCEDURE — 80069 RENAL FUNCTION PANEL: CPT

## 2023-01-23 PROCEDURE — 65270000046 HC RM TELEMETRY

## 2023-01-23 PROCEDURE — 85027 COMPLETE CBC AUTOMATED: CPT

## 2023-01-23 PROCEDURE — 74011250636 HC RX REV CODE- 250/636: Performed by: PHYSICIAN ASSISTANT

## 2023-01-23 PROCEDURE — 74011250637 HC RX REV CODE- 250/637: Performed by: PHYSICIAN ASSISTANT

## 2023-01-23 PROCEDURE — 77010033678 HC OXYGEN DAILY

## 2023-01-23 PROCEDURE — C9113 INJ PANTOPRAZOLE SODIUM, VIA: HCPCS | Performed by: PHYSICIAN ASSISTANT

## 2023-01-23 PROCEDURE — 30233N1 TRANSFUSION OF NONAUTOLOGOUS RED BLOOD CELLS INTO PERIPHERAL VEIN, PERCUTANEOUS APPROACH: ICD-10-PCS | Performed by: FAMILY MEDICINE

## 2023-01-23 PROCEDURE — 74011000250 HC RX REV CODE- 250: Performed by: PHYSICIAN ASSISTANT

## 2023-01-23 RX ORDER — LORAZEPAM 1 MG/1
2 TABLET ORAL 2 TIMES DAILY
Status: DISCONTINUED | OUTPATIENT
Start: 2023-01-23 | End: 2023-01-24 | Stop reason: HOSPADM

## 2023-01-23 RX ORDER — GABAPENTIN 300 MG/1
900 CAPSULE ORAL
Status: DISCONTINUED | OUTPATIENT
Start: 2023-01-23 | End: 2023-01-23

## 2023-01-23 RX ORDER — LORAZEPAM 1 MG/1
1 TABLET ORAL ONCE
Status: ACTIVE | OUTPATIENT
Start: 2023-01-23 | End: 2023-01-23

## 2023-01-23 RX ORDER — LORAZEPAM 1 MG/1
1 TABLET ORAL
Status: DISCONTINUED | OUTPATIENT
Start: 2023-01-23 | End: 2023-01-24 | Stop reason: HOSPADM

## 2023-01-23 RX ORDER — GABAPENTIN 300 MG/1
900 CAPSULE ORAL
Status: DISCONTINUED | OUTPATIENT
Start: 2023-01-23 | End: 2023-01-24 | Stop reason: HOSPADM

## 2023-01-23 RX ORDER — GABAPENTIN 300 MG/1
600 CAPSULE ORAL 2 TIMES DAILY
Status: DISCONTINUED | OUTPATIENT
Start: 2023-01-23 | End: 2023-01-24 | Stop reason: HOSPADM

## 2023-01-23 RX ORDER — LORAZEPAM 1 MG/1
1 TABLET ORAL 2 TIMES DAILY
Status: DISCONTINUED | OUTPATIENT
Start: 2023-01-23 | End: 2023-01-23

## 2023-01-23 RX ADMIN — LORAZEPAM 2 MG: 1 TABLET ORAL at 18:45

## 2023-01-23 RX ADMIN — PANTOPRAZOLE SODIUM 40 MG: 40 INJECTION, POWDER, FOR SOLUTION INTRAVENOUS at 11:25

## 2023-01-23 RX ADMIN — GABAPENTIN 900 MG: 300 CAPSULE ORAL at 22:26

## 2023-01-23 RX ADMIN — SODIUM CHLORIDE, PRESERVATIVE FREE 10 ML: 5 INJECTION INTRAVENOUS at 22:27

## 2023-01-23 RX ADMIN — LORAZEPAM 2 MG: 1 TABLET ORAL at 11:25

## 2023-01-23 RX ADMIN — GABAPENTIN 900 MG: 300 CAPSULE ORAL at 00:26

## 2023-01-23 RX ADMIN — SODIUM CHLORIDE, SODIUM LACTATE, POTASSIUM CHLORIDE, AND CALCIUM CHLORIDE 75 ML/HR: 600; 310; 30; 20 INJECTION, SOLUTION INTRAVENOUS at 22:58

## 2023-01-23 RX ADMIN — SODIUM CHLORIDE, PRESERVATIVE FREE 10 ML: 5 INJECTION INTRAVENOUS at 14:47

## 2023-01-23 RX ADMIN — SODIUM CHLORIDE, PRESERVATIVE FREE 10 ML: 5 INJECTION INTRAVENOUS at 00:27

## 2023-01-23 RX ADMIN — GABAPENTIN 600 MG: 300 CAPSULE ORAL at 11:25

## 2023-01-23 RX ADMIN — SODIUM CHLORIDE, PRESERVATIVE FREE 10 ML: 5 INJECTION INTRAVENOUS at 06:00

## 2023-01-23 RX ADMIN — ARIPIPRAZOLE 5 MG: 5 TABLET ORAL at 11:25

## 2023-01-23 RX ADMIN — PANTOPRAZOLE SODIUM 40 MG: 40 INJECTION, POWDER, FOR SOLUTION INTRAVENOUS at 22:26

## 2023-01-23 RX ADMIN — SODIUM CHLORIDE, SODIUM LACTATE, POTASSIUM CHLORIDE, AND CALCIUM CHLORIDE 75 ML/HR: 600; 310; 30; 20 INJECTION, SOLUTION INTRAVENOUS at 00:28

## 2023-01-23 RX ADMIN — PANTOPRAZOLE SODIUM 40 MG: 40 INJECTION, POWDER, FOR SOLUTION INTRAVENOUS at 00:26

## 2023-01-23 RX ADMIN — LACTULOSE 30 ML: 20 SOLUTION ORAL at 11:25

## 2023-01-23 NOTE — PROGRESS NOTES
attempted to complete the initial Spiritual Assessment of the patient in bed 9 of the emergency room but found the patient non-verbal and not responsive. There is no advance directive present according to care taker who is present at bedside. Patient does not have any Methodist/cultural needs that will affect patients preferences in health care. Chaplains will continue to follow and will provide pastoral care on an as needed/requested basis.     [facility] Care Department  [phone]

## 2023-01-23 NOTE — ED NOTES
Assumed care of pt at this time. Pt resting comfortably in stretcher. PAULINO Sitter from facility at bedside.

## 2023-01-23 NOTE — CONSULTS
[email or web address]  [phone]    GASTROENTEROLOGY CONSULT      Impression:   1. Acute on chronic anemia  -H/H on admission 6.6/18.4 now 8.7/23.8 s/p 1 unit PRBC  -B12/folate/iron all normal  -Occult stool negative  2. Acute renal failure  3. Congential abnormalities, profound intellectual disability  4. Hx of seizures      Plan:     1. Monitor H/H and transfuse as per protocol  2. Continue BID PPI  3. No EGD planned at this time      Chief Complaint: non-verbal at baseline, admitted for UTI and reported possible bloody drainage from G-tube      HPI:  Dalia Tsai is a 28 y.o. male who I am being asked to see in consultation for an opinion regarding anemia with reported bleeding from G-tube per review of notes. Patient non-verbal at baseline and admitted for worsening respiratory congestion and UTI. Patient father does not want \"heroic measures\" and palliative care consult already requested. No obvious abdomina pain and no current bleeding from capped G-tube or blood noted in the lumen. Occult stool testing negative. H&H improved s/p transfusion. Gastric occult ordered and pending at this time.     PMH:   Past Medical History:   Diagnosis Date    Anxiety     Bowel and bladder incontinence 6/14/2022    Congenital blindness     Congenital deformity of eyelid     Contracture of joint of both feet 6/14/2022    Contracture of joint of both hands     Contracture of knee     bilateral    Dupuytren's contracture of both hands 6/14/2022    Eczema     Environmental and seasonal allergies 6/14/2022    Frequent UTI 6/14/2022    Gastrointestinal tube present ([address] 6/14/2022    GERD (gastroesophageal reflux disease)     Gynecomastia     bilateral    History of laser refractive surgery     Nonverbal 6/14/2022    Peter's anomaly     Profound intellectual disability     Requires daily assistance for activities of daily living (ADL) and comfort needs 6/14/2022    Scoliosis     Seizures ([address] 6/14/2022       PSH:   Past Surgical History:   Procedure Laterality Date    HX MYRINGOTOMY         Social HX:   Social History     Socioeconomic History    Marital status: SINGLE     Spouse name: Not on file    Number of children: Not on file    Years of education: Not on file    Highest education level: Not on file   Occupational History    Not on file   Tobacco Use    Smoking status: Never    Smokeless tobacco: Never   Vaping Use    Vaping Use: Never used   Substance and Sexual Activity    Alcohol use: Never    Drug use: Never    Sexual activity: Not on file   Other Topics Concern    Not on file   Social History Narrative    Not on file     Social Determinants of Health     Financial Resource Strain: Not on file   Food Insecurity: Not on file   Transportation Needs: Not on file   Physical Activity: Not on file   Stress: Not on file   Social Connections: Not on file   Intimate Partner Violence: Not on file   Housing Stability: Not on file       FHX:   History reviewed. No pertinent family history.     Allergy:   Allergies   Allergen Reactions    Erythromycin Unknown (comments)    Risperidone Unknown (comments)    Sulfisoxazole Unknown (comments)    Suprax [Cefixime] Unknown (comments)       Patient Active Problem List   Diagnosis Code    Requires daily assistance for activities of daily living (ADL) and comfort needs Z74.1    Gastrointestinal tube present ([facility] Z93.1    Legally blind H54.8    Nonverbal R47.01    Frequent UTI N39.0    Bowel and bladder incontinence R32, R15.9    Environmental and seasonal allergies J30.89    Seizures (HCC) R56.9    Dupuytren's contracture of both hands M72.0    Contracture of joint of both feet M24.574, M24.575    COVID-19 virus infection U07.1    Hospital discharge follow-up Z09    Anemia D64.9    DEVIKA (acute kidney injury) ([facility] N17.9    GIB (gastrointestinal bleeding) K92.2    Congenital abnormalities Q89.9       Home Medications:     (Not in a hospital admission)      Review of Systems: patient non-verbal at baseline         Visit Vitals  BP (!) 149/76 (BP 1 Location: Right upper arm, BP Patient Position: Semi fowlers)   Pulse 89   Temp 97.5 °F (36.4 °C)   Resp 20   Ht 4' 10\" (1.473 m)   Wt 40.8 kg (90 lb)   SpO2 97%   BMI 18.81 kg/m²       Physical Assessment:     constitutional:  in no acute distress. skin: inspection: no rashes, ulcers, icterus or other lesions; no clubbing or telangiectasias. palpation: no induration or subcutaneos nodules. eyes: inspection: normal conjunctivae and lids; no jaundice  ENMT: mouth: normal oral mucosa,lips and gums; poor dentition. respiratory: effort: normal chest excursion; no intercostal retraction or accessory muscle use. cardiovascular: normal rhythm; no thrill or murmurs. abdominal: abdomen: normal consistency; no tenderness or masses. G-tube LUQ, mild erythema around site, no drainage or bleeding, hernias: no hernias appreciated. liver: normal size and consistency. spleen: not palpable. rectal: hemoccult/guaiac: not performed. musculoskeletal: contractures       Basic Metabolic Profile   Recent Labs     01/23/23 0413      K 4.2   *   CO2 24   BUN 59*   GLU 92   CA 8.4*   PHOS 4.2         CBC w/Diff    Recent Labs     01/23/23 0413   WBC 6.2   RBC 2.48*   HGB 8.7*   HCT 23.8*   MCV 96.0   MCH 35.1*   MCHC 36.6   RDW 15.2*       Recent Labs     01/22/23 1951   GRANS 63   LYMPH 22   EOS 5        Hepatic Function   Recent Labs     01/23/23 0413 01/22/23 1951   ALB 2.2* 2.2*   TP  --  5.0*   TBILI  --  0.7   AP  --  71        Coags   No results for input(s): PTP, INR, APTT, INREXT in the last 72 hours. Robin Haywood NP. [facility], [address] [place] [address]    [email or web address]

## 2023-01-23 NOTE — CONSULTS
[facility]: [phone]  [facility]: [phone]     Patient Name: Stephy Harmon  YOB: 1990    Date of consult : 1/23/23  Reason for Consult: establish goals of care  Requesting Provider: Mayela Rodríguez MD    Primary Care Physician: Emmett Stark DNP      SUMMARY:   Stephy Harmon is a 28 y.o. male with a past history of profound intellectual disability, congenital blindness, multiple contractures, scoliosis, seizures, who was admitted on 1/22/2023 from [address] [facility]  with a diagnosis of DEVIKA vs CKD and bleeding from the G Tube. Current medical issues leading to Palliative Medicine involvement include: Pt with a long term life limiting chronic disease process that warrants discussions about his goals of care. .    CHIEF COMPLAINT: non verbal and minimally interactive     HPI/SUBJECTIVE:    Pt is a 28year old severely debilitated and Intellectually disabled man who lives full time in a group home. According to the Group Home attendant patient has become more lethargic and less interactive than usual and developed some nasal congestion sending him to the ED. He was found to have blood loss anemia requiring a transfusion and was found to have elevated creatinine levels with a low EGFR. The patient is:   [] Verbal and participatory  [x] Non-participatory due to: non verbal     GOALS OF CARE:  Patient/Health Care Proxy Stated Goals: Prolong life      TREATMENT PREFERENCES:   Code Status: DNR         PALLIATIVE DIAGNOSES:   Goals of care/ACP  Blood loss anemia  Profound MR  Debility       PLAN:   Goals of care/ACP  This NP along with Misa Heller LMSW and Jatin Trujillo RN in to meet with the patient at the bedside, he was accompanied by one of the nurses from the group home who was able to give us some background information on the patient's baseline functional status.  Pt is not able to participate in any goals of care discussion. We reached out to the patient's father Kenneth Chen who was clear that he is ok with any treatments or tests that are non invasive and will serve to help his son but does not want any procedures that cause suffering or only serve to prolong his life without improving quality. He confirmed that he wants Roma Perryd to be a DNR/DNI and is ok with a referral for evaluation from hospice. Goals of care: DNR/DNI (need to obtain a POST)   2.   Blood loss anemia  On admission came in with a HGB of 6.6 and HCT of 18.4 received PRBC. Found to have a bleed from the G Tube but patient's father does not want an EGD performed only treat symptomatically  3. Profound MR  Pt with very low level intellectual function combined with blindness. 4.   Debility  Patient's palliative performance score is around 20% times completely bedbound at this time able to do any sort of activity, his total care for his self-care and functional ADLs and he is fed by feeding tube he has drowsy and minimally interactive level of consciousness. 5.   Initial consult note routed to primary continuity provider  6. Communicated plan of care with: Palliative IDT      Advance Care Planning:  [] [facility] Interdisciplinary Team has updated the ACP Navigator with [address] and Patient Capacity    Primary Decision Maker ([address]):     Medical Interventions: Limited additional interventions   Other Instructions:   Artificially Administered Nutrition: Feeding tube long-term, if indicated     As far as possible, the palliative care team has discussed with patient / health care proxy about goals of care / treatment preferences for patient.          HISTORY:     History obtained from: family interview and chart review   Principal Problem:    DEVIKA (acute kidney injury) ([facility] (1/22/2023)    Active Problems:    Requires daily assistance for activities of daily living (ADL) and comfort needs (6/14/2022)      Gastrointestinal tube present ([facility] (6/14/2022)      Legally blind (6/14/2022)      Nonverbal (6/14/2022)      Seizures ([address] (6/14/2022)      Anemia (1/22/2023)      GIB (gastrointestinal bleeding) (1/23/2023)      Congenital abnormalities (1/23/2023)    Past Medical History:   Diagnosis Date    Anxiety     Bowel and bladder incontinence 6/14/2022    Congenital blindness     Congenital deformity of eyelid     Contracture of joint of both feet 6/14/2022    Contracture of joint of both hands     Contracture of knee     bilateral    Dupuytren's contracture of both hands 6/14/2022    Eczema     Environmental and seasonal allergies 6/14/2022    Frequent UTI 6/14/2022    Gastrointestinal tube present ([facility] 6/14/2022    GERD (gastroesophageal reflux disease)     Gynecomastia     bilateral    History of laser refractive surgery     Nonverbal 6/14/2022    Peter's anomaly     Profound intellectual disability     Requires daily assistance for activities of daily living (ADL) and comfort needs 6/14/2022    Scoliosis     Seizures ([facility] 6/14/2022      Past Surgical History:   Procedure Laterality Date    HX MYRINGOTOMY        History reviewed. No pertinent family history. History reviewed, no pertinent family history.   Social History     Tobacco Use    Smoking status: Never    Smokeless tobacco: Never   Substance Use Topics    Alcohol use: Never     Allergies   Allergen Reactions    Erythromycin Unknown (comments)    Risperidone Unknown (comments)    Sulfisoxazole Unknown (comments)    Suprax [Cefixime] Unknown (comments)      Current Facility-Administered Medications   Medication Dose Route Frequency    gabapentin (NEURONTIN) capsule 600 mg  600 mg Per G Tube BID    gabapentin (NEURONTIN) capsule 900 mg  900 mg Per G Tube QHS    lactulose (CHRONULAC) 10 gram/15 mL solution 30 mL  20 g Per G Tube DAILY    LORazepam (ATIVAN) tablet 2 mg  2 mg Per G Tube BID    LORazepam (ATIVAN) tablet 1 mg  1 mg Per G Tube DAILY WITH LUNCH    0.9% sodium chloride infusion 250 mL  250 mL IntraVENous PRN    ARIPiprazole (ABILIFY) tablet 5 mg  5 mg Per G Tube DAILY    pantoprazole (PROTONIX) 40 mg in 0.9% sodium chloride 10 mL injection  40 mg IntraVENous Q12H    sodium chloride (NS) flush 5-40 mL  5-40 mL IntraVENous Q8H    sodium chloride (NS) flush 5-40 mL  5-40 mL IntraVENous PRN    acetaminophen (TYLENOL) tablet 650 mg  650 mg Oral Q6H PRN    Or    acetaminophen (TYLENOL) suppository 650 mg  650 mg Rectal Q6H PRN    polyethylene glycol (MIRALAX) packet 17 g  17 g Oral DAILY PRN    bisacodyL (DULCOLAX) suppository 10 mg  10 mg Rectal DAILY PRN    ondansetron (ZOFRAN ODT) tablet 4 mg  4 mg Oral Q8H PRN    Or    ondansetron (ZOFRAN) injection 4 mg  4 mg IntraVENous Q6H PRN    lactated Ringers infusion  75 mL/hr IntraVENous CONTINUOUS     Current Outpatient Medications   Medication Sig    ARIPiprazole (ABILIFY) 5 mg tablet 5 mg by Per G Tube route daily. cranberry fruit extract (cranberry extract) 500 mg tab Take 1 Tablet by mouth daily. dextromethorphan-guaiFENesin (Robitussin Cough-Chest Anselmo DM)  mg cap As needed on label as needed  Indications: cough    LORazepam (ATIVAN) 2 mg tablet Administer 1 tablet every morning (8 am), 1/2 tablet at noon, and 1 tablet at bedtime per G-TUBE for agitation. ascorbic acid, vitamin C, (VITAMIN C) 500 mg tablet Take  by Gtube. Administer 1 packet of vitamin C mg with Vitamin D and Zinc mixed with 4 - 8oz of water daily as needed    polyethylene glycol (MIRALAX) 17 gram/dose powder 17 g by Per G Tube route daily as needed for Constipation. loratadine (CLARITIN) 10 mg tablet 0.5 Tablets by Per G Tube route daily as needed for Allergies. lactulose (CHRONULAC) 10 gram/15 mL solution Take 30 mL by mouth daily. cloNIDine HCL (CATAPRES) 0.1 mg tablet 1 Tablet by Per G Tube route three (3) times daily.     nutritional supplements (Osmolite 1.5 Navi) 0.06 gram-1.5 kcal/mL liqd Administer 1 can per G-TUBE 4 times daily (8am, 12 pm, 4 pm, 8 pm) for nutrition. fluticasone propionate (FLONASE) 50 mcg/actuation nasal spray 2 Sprays by Both Nostrils route daily as needed for Rhinitis.    gabapentin (NEURONTIN) 600 mg tablet TAKE 1 TABLET BY MOUTH IN THE MORNING AT 8AM, TAKE 1 TABLET AT NOON THEN 1 AND 1/2 TABLETS AT BEDTIME FOR SEIZURES    melatonin 5 mg tablet Take 5 mg by mouth nightly. 1 tablet at bedtime per G-tube    famotidine (PEPCID) 20 mg tablet 20 mg by Per G Tube route daily. Ostomy Supplies powd Apply  to affected area as needed. ACETAMINOPHEN PO by Gastrostomy Tube route. white petrolatum (Desitin Multi-Purpose) 71.3 % oint by Apply Externally route. ibuprofen (MOTRIN) 400 mg tablet by Per G Tube route every six (6) hours as needed for Pain. Menthol/Camphor/Phen/Salicylic (LIP BALM MEDICATED EX) by Apply Externally route. Clinical Pain Assessment (nonverbal scale for nonverbal patients): Activity (Movement): Lying quietly, normal position    Duration: for how long has pt been experiencing pain (e.g., 2 days, 1 month, years)  Frequency: how often pain is an issue (e.g., several times per day, once every few days, constant)     FUNCTIONAL ASSESSMENT:     Palliative Performance Scale (PPS):  PPS: 20    ECOG  ECOG Status : Completely disabled     PSYCHOSOCIAL/SPIRITUAL SCREENING:      Any spiritual / Zoroastrian concerns:  [] Yes /  [x] No    Caregiver Burnout:  [] Yes /  [] No /  [x] No Caregiver Present      Anticipatory grief assessment:   [x] Normal  / [] Maladaptive        REVIEW OF SYSTEMS:     Systems: constitutional, ears/nose/mouth/throat, respiratory, gastrointestinal, genitourinary, musculoskeletal, integumentary, neurologic, psychiatric, endocrine. Positive findings noted below. Modified ESAS Completed by: provider                       Dyspnea: 0               Positive and pertinent negative findings in ROS are noted above in HPI.   The following systems were [x] reviewed / [] unable to be reviewed as noted in HPI  Other findings are noted below. PHYSICAL EXAM:     Constitutional: eyes closed and appears in no distress   Eyes: not observed   ENMT: no nasal discharge, moist mucous membranes  Cardiovascular: regular rhythm, distal pulses intact  Respiratory: breathing not labored, symmetric  Gastrointestinal: soft non-tender, +bowel sounds  Musculoskeletal: no deformity, no tenderness to palpation  Skin: warm, dry  Neurologic: not following commands, moving some extremities, pt is non verbal     Other: Wt Readings from Last 3 Encounters:   01/22/23 40.8 kg (90 lb)   10/11/22 40.8 kg (90 lb)   09/30/22 40.8 kg (90 lb)     Blood pressure (!) 158/100, pulse 85, temperature 97.5 °F (36.4 °C), resp. rate 23, height 4' 10\" (1.473 m), weight 40.8 kg (90 lb), SpO2 100 %. Pain:  Pain Scale 1: FACES                         LAB AND IMAGING FINDINGS:     Lab Results   Component Value Date/Time    WBC 6.2 01/23/2023 04:13 AM    HGB 8.7 (L) 01/23/2023 04:13 AM    PLATELET 312 69/38/4395 04:13 AM     Lab Results   Component Value Date/Time    Sodium 142 01/23/2023 04:13 AM    Potassium 4.2 01/23/2023 04:13 AM    Chloride 112 (H) 01/23/2023 04:13 AM    CO2 24 01/23/2023 04:13 AM    BUN 59 (H) 01/23/2023 04:13 AM    Creatinine 3.62 (H) 01/23/2023 04:13 AM    Calcium 8.4 (L) 01/23/2023 04:13 AM    Phosphorus 4.2 01/23/2023 04:13 AM      Lab Results   Component Value Date/Time    Alk.  phosphatase 71 01/22/2023 07:51 PM    Protein, total 5.0 (L) 01/22/2023 07:51 PM    Albumin 2.2 (L) 01/23/2023 04:13 AM    Globulin 2.8 01/22/2023 07:51 PM     No results found for: INR, PTMR, PTP, PT1, PT2, APTT, INREXT, INREXT   Lab Results   Component Value Date/Time    Iron 127 01/22/2023 07:51 PM    TIBC 221 (L) 01/22/2023 07:51 PM    Iron % saturation 57 (H) 01/22/2023 07:51 PM      No results found for: PH, PCO2, PO2  No components found for: GLPOC   No results found for: CPK, CKMB           Total time: 55 minutes   Counseling / coordination time, spent as noted above:   > 50% counseling / coordination:  Time spent in direct consultation with the patient, medical team, and family     Prolonged service was provided for  []30 min   []75 min in face to face time in the presence of the patient, spent as noted above. Time Start:   Time End:     Disclaimer: Sections of this note are dictated using utilizing voice recognition software, which may have resulted in some phonetic based errors in grammar and contents. Even though attempts were made to correct all the mistakes, some may have been missed, and remained in the body of the document. If questions arise, please contact our department.

## 2023-01-23 NOTE — CONSULTS
Comprehensive Nutrition Assessment    Type and Reason for Visit: Initial, Consult, Positive nutrition screen    Nutrition Recommendations/Plan:   Initiate tube feeding:   Formula: Jevity 1.5  Initial Rate: 20 mL/hr  Advancement: 10 q 8 hrs as tolerated  Goal Rate: 40 mL/hr   Water Flushes: 125 mL q 4 hours (750 mL total)  Modular(s):  none at this time  Goal Regimen Provides (with modulars): 1440 kcal, 61 gm pro, 730 mL free water from tube feeding only, 96% RDIs    Add daily liquid MVI with iron supplement as TF regimen at goal rate does not provide 100% RDIs  Consider discontinuing/ decreasing rate of IVF as pt tolerates advancement of TF towards goal rate     Malnutrition Assessment:  Malnutrition Status:  Insufficient data (01/23/23 1652)      Nutrition History and Allergies:   Past medical hx:   anxiety, congenital blindness, frequent UTI, PEG tube, GERD, nonverbal, intellectual disability, requires assistance for ADLs and comfort needs, seizures. Wt trends PTA per chart hx:  85 lb on 3/28/22,  90 lb on 9/30/33,   90 lb on 1/22/23. Wt gain of 5 lb during past several months PTA per chart hx. No known food allergies     Nutrition Assessment:    Pt is nonverbal at baseline; lives in group home; has PEG tube. Nutrition consult received to provide tube feeds. Pt admitted with c/o worsening respiratory congestion and foul-smelling urine. Pt with DEVIKA. Nutrition Related Findings:    Last BM unknown. No edema. IVF, LR at 75 mL/hr. Pertinent meds:  lactulose, protonix, bowel regimen Wound Type: None    Current Nutrition Intake & Therapies:  Average Meal Intake: NPO  Average Supplement Intake: NPO  DIET NPO    Anthropometric Measures:  Height: 4' 10\" (147.3 cm)  Ideal Body Weight (IBW): 94 lbs (43 kg)  Admission Body Weight: 89 lb 15.2 oz  Current Body Wt:  40.8 kg (89 lb 15.2 oz), 95.7 % IBW.  Not specified  Current BMI (kg/m2): 18.8  Usual Body Weight: 38.6 kg (85 lb)  % Weight Change (Calculated): 5.8  Weight Adjustment: No adjustment  BMI Category: Normal weight (BMI 18.5-24. 9)    Estimated Daily Nutrient Needs:  Energy Requirements Based On: Formula (MSJ x1.2-.14)  Weight Used for Energy Requirements: Admission  Energy (kcal/day): 8757-1889  Weight Used for Protein Requirements: Admission  Protein (g/day): 33-41 (wt x0.8-1)  Method Used for Fluid Requirements: 1 ml/kcal  Fluid (ml/day): 5269-9341    Nutrition Diagnosis:   Inadequate oral intake related to swallowing difficulty as evidenced by NPO or clear liquid status due to medical condition, nutrition support-enteral nutrition    Nutrition Interventions:   Food and/or Nutrient Delivery: Continue NPO, Vitamin supplement, Mineral supplement, Start tube feeding, IV fluid delivery  Nutrition Education/Counseling: Education not indicated  Coordination of Nutrition Care: Continue to monitor while inpatient       Goals:     Goals: Initiate nutrition support, Tolerate nutrition support at goal rate, by next RD assessment       Nutrition Monitoring and Evaluation:   Behavioral-Environmental Outcomes: None identified  Food/Nutrient Intake Outcomes: Enteral nutrition intake/tolerance, Vitamin/mineral intake, IVF intake  Physical Signs/Symptoms Outcomes: Biochemical data, Chewing or swallowing, GI status, Fluid status or edema    Discharge Planning:    Enteral nutrition    Chantelle Reed, [address]  Contact: [phone]

## 2023-01-23 NOTE — ACP (ADVANCE CARE PLANNING)
Advance Care Planning     General Advance Care Planning (ACP) Conversation        Palliative Medicine  Visited patient along with Palliative team members JIMMY Torres LMSW and ROSIE Lynch NP. Patient resting quietly on stretcher ED 9, eyes closed. Holding a rolled sock in his hands. Did not respond when name called, did make a \"moaning\" sound. Caregiver from [address] [place] [address] at bedside, able to provide our team with patient's information and usual daily activity. She states he does not like loud noises or bright lights, prefers to sleep on mattress on the floor. Caregiver also stated that patient is total care, but, will assist with putting clothes on. She did state that patient's father has requested \"no aggressive\" measures. Palliative team will reach out to father to discuss goals of care. Caregiver provided our team with the group [place]'s nurse manager, Paige, [phone]. She can be available if needed. Patient presented to the ED 1/22/23 with congestion. PMH: Anemia, Seizure Disorder, PEG, Congenital Abnormalities, Intellectual disability. Pt does not have an Advance Directive on file in EMR. He is not able to complete one. Father, Melissa Castellano, is listed as emergency contact. Call placed to father at [phone]. Mr. Mike Zamudio shared that his son has lived longer than expected, has had a \"good life\", and he wants him to be as \"comfortable\" as possible. Father also stated that when the \"time\" comes, he wants his son to go to the [facility] peacefully. Mr. Mike Zamudio stated he lives in [facility], but can be here at any time if needed.      Primary Decision Maker (Health Care Agent): Melissa Castellano  Relationship to patient: father  Phone number: [phone]  [] Named in a scanned document   [x] Legal Next of Kin  [] Guardian    ACP documents you currently have include:  [] Advance Directive or Living Will  [] Durable Do Not Resuscitate  [] Physician Orders for Scope of Treatment (POST)  [] Medical Power of   [x] Other-DNR     Thank you for the Palliative Medicine consult and allowing us to participate in the care of Mr. Elisha Christopher.   Will continue to monitor and provide support    CODE STATUS: DNR/DNI    Blanca Tavera RN  Palliative Medicine Inpatient RN  [address] [place] [address] St: [phone])

## 2023-01-23 NOTE — ED NOTES
Unable to give 0900 meds via Hector/Peg tube as this facility does not have the specific supplies. Sitter at bedside (from pt's facility) contacting facility to obtain supplies. Awaiting response at this time.

## 2023-01-23 NOTE — ED NOTES
TF starting at 20ml/hr with Q4 flushes     To be titrated to 30ml/hr @ 0245 if tolerating feeds well

## 2023-01-23 NOTE — PALLIATIVE CARE
[facility] [phone]  [facility] [phone]     Lencho Vu, NP, Deepak Givens, RN and this LMSW attended to pt at bedside. Pt laying in bed with eyes closed, holding and manipulating sock in his hands. He did not respond when spoken to, but did moan at one point. Severo Freed, caregiver from [facility], at bedside. She provided background information for pt's assessment. Per Severo Freed, pt much more calm and less active than he normally is. She reports he usually finds it unacceptable to be laying in a bed, and prefers to be on his matress on the floor at the facility. She reports he is also averse to loud noises and bright lights, and she's surprised he is not more agitated by them at this time. She reports pt is very sweet and will assist with getting dressed as much as he is able to. She reports they do pretty much everything else for him. She reports her understanding is pt's father does not want aggressive treatment measures for pt. She was informed we will follow up with pt's father to discuss plans for pt's care moving forward. NP asked if facility can manage pt on hospice, should pt's father decide to choose that option. Severo Freed made telephone contact with Nurse Manager, Chong Juarez at [phone]. She indicated she will contact her supervisor to get this answer and will report back, when Palliative calls to notify her of father's decision regarding pt's plan of care. [facility] and aforementioned palliative team made telephone contact with pt's father, Shamir Hurtado at [phone]. [facility] introduced palliative team and purpose of palliative care. Pt's father reports pt has lived longer than anyone ever expected and has had the best life possible. He reports at this time he still wants pt to be worked up, but does not want invasive testing or treatment, as it would cause undo suffering for pt.   Palliative team informed him, we will continue to follow patient and the workup being done to determine cause of his current medical issues, and will follow up once more information has been ascertained. No further needs or concerns at this time. 1230 hr LMSW attempted to reach [facility] at [phone], informed \"wrong number,\" by female who answered. LMSW attempted to reach number listed for facility at [phone], recording states voicemail is full. Will follow up with caregiver at bedside at a later time. 1550 hr  Palliative team received  requesting return call to pt's father, Abigail Bowser. This LMSW returned call to aforementioned number and spoke with pt's father. Pt's father requesting update. LMSW informed him, at this time, we don't have any new information to provide at this time. LMSW informed him pt is being admitted, and we will hopefully get more information regarding his condition and recommendations for care by tomorrow. LMSW informed pt's father we will call him tomorrow to follow up. He expressed thanks. No further questions or concerns at this time. Thank you for this referral to Palliative Care. The palliative care team remains available to provide support for patient and his family. Goals of care have been addressed; will be readdressed as appropriate.       Toby Cushing, [facility]  Palliative Medicine Inpatient   [facility]  Palliative COPE Line: [phone]

## 2023-01-23 NOTE — CONSENT
Informed Consent for Blood Component Transfusion Note    I have discussed with the patient's father Sadaf Parham the rationale for blood component transfusion; its benefits in treating or preventing fatigue, organ damage, or death; and its risk which includes mild transfusion reactions, rare risk of blood borne infection, or more serious but rare reactions. I have discussed the alternatives to transfusion, including the risk and consequences of not receiving transfusion. The guardian had an opportunity to ask questions and had agreed to proceed with transfusion of blood components.     Electronically signed by Brady Blanca MD on 1/22/23 at 9:09 PM

## 2023-01-23 NOTE — ED NOTES
Per caregiver from pt's facility at bedside, manager of pt's facility will be bringing radha button supplies \"here soon\". Awaiting supplies to give 0800/0900 meds.

## 2023-01-24 ENCOUNTER — HOSPICE ADMISSION (OUTPATIENT)
Dept: HOSPICE | Facility: HOSPICE | Age: 33
End: 2023-01-24
Payer: MEDICAID

## 2023-01-24 ENCOUNTER — HOME CARE VISIT (OUTPATIENT)
Dept: HOSPICE | Facility: HOSPICE | Age: 33
End: 2023-01-24
Payer: MEDICAID

## 2023-01-24 VITALS
HEART RATE: 88 BPM | WEIGHT: 90 LBS | SYSTOLIC BLOOD PRESSURE: 150 MMHG | RESPIRATION RATE: 18 BRPM | DIASTOLIC BLOOD PRESSURE: 88 MMHG | TEMPERATURE: 98.1 F | OXYGEN SATURATION: 93 % | BODY MASS INDEX: 17.67 KG/M2 | HEIGHT: 60 IN

## 2023-01-24 VITALS
HEART RATE: 100 BPM | RESPIRATION RATE: 19 BRPM | TEMPERATURE: 100.2 F | OXYGEN SATURATION: 99 % | DIASTOLIC BLOOD PRESSURE: 85 MMHG | SYSTOLIC BLOOD PRESSURE: 152 MMHG

## 2023-01-24 PROBLEM — R53.81 DEBILITY: Status: ACTIVE | Noted: 2023-01-01

## 2023-01-24 PROBLEM — R53.81 DEBILITY: Status: ACTIVE | Noted: 2023-01-24

## 2023-01-24 LAB
ALBUMIN SERPL-MCNC: 2.3 G/DL (ref 3.4–5)
ANION GAP SERPL CALC-SCNC: 8 MMOL/L (ref 3–18)
BUN SERPL-MCNC: 56 MG/DL (ref 7–18)
BUN/CREAT SERPL: 16 (ref 12–20)
CALCIUM SERPL-MCNC: 8.7 MG/DL (ref 8.5–10.1)
CHLORIDE SERPL-SCNC: 115 MMOL/L (ref 100–111)
CO2 SERPL-SCNC: 23 MMOL/L (ref 21–32)
CREAT SERPL-MCNC: 3.45 MG/DL (ref 0.6–1.3)
ERYTHROCYTE [DISTWIDTH] IN BLOOD BY AUTOMATED COUNT: 16 % (ref 11.6–14.5)
GLUCOSE SERPL-MCNC: 120 MG/DL (ref 74–99)
HCT VFR BLD AUTO: 26.2 % (ref 36–48)
HGB BLD-MCNC: 9.5 G/DL (ref 13–16)
MCH RBC QN AUTO: 35.6 PG (ref 24–34)
MCHC RBC AUTO-ENTMCNC: 36.3 G/DL (ref 31–37)
MCV RBC AUTO: 98.1 FL (ref 78–100)
NRBC # BLD: 0 K/UL (ref 0–0.01)
NRBC BLD-RTO: 0 PER 100 WBC
PHOSPHATE SERPL-MCNC: 3.8 MG/DL (ref 2.5–4.9)
PLATELET # BLD AUTO: 213 K/UL (ref 135–420)
PMV BLD AUTO: 9.4 FL (ref 9.2–11.8)
POTASSIUM SERPL-SCNC: 3.7 MMOL/L (ref 3.5–5.5)
RBC # BLD AUTO: 2.67 M/UL (ref 4.35–5.65)
SODIUM SERPL-SCNC: 146 MMOL/L (ref 136–145)
WBC # BLD AUTO: 14.3 K/UL (ref 4.6–13.2)

## 2023-01-24 PROCEDURE — 74011250637 HC RX REV CODE- 250/637: Performed by: PHYSICIAN ASSISTANT

## 2023-01-24 PROCEDURE — 74011250636 HC RX REV CODE- 250/636: Performed by: STUDENT IN AN ORGANIZED HEALTH CARE EDUCATION/TRAINING PROGRAM

## 2023-01-24 PROCEDURE — 99232 SBSQ HOSP IP/OBS MODERATE 35: CPT | Performed by: NURSE PRACTITIONER

## 2023-01-24 PROCEDURE — 80069 RENAL FUNCTION PANEL: CPT

## 2023-01-24 PROCEDURE — C9113 INJ PANTOPRAZOLE SODIUM, VIA: HCPCS | Performed by: PHYSICIAN ASSISTANT

## 2023-01-24 PROCEDURE — 99239 HOSP IP/OBS DSCHRG MGMT >30: CPT | Performed by: FAMILY MEDICINE

## 2023-01-24 PROCEDURE — 74011000250 HC RX REV CODE- 250: Performed by: PHYSICIAN ASSISTANT

## 2023-01-24 PROCEDURE — 85027 COMPLETE CBC AUTOMATED: CPT

## 2023-01-24 PROCEDURE — G0299 HHS/HOSPICE OF RN EA 15 MIN: HCPCS

## 2023-01-24 PROCEDURE — 74011250637 HC RX REV CODE- 250/637: Performed by: FAMILY MEDICINE

## 2023-01-24 PROCEDURE — 0651 HSPC ROUTINE HOME CARE

## 2023-01-24 PROCEDURE — 74011250636 HC RX REV CODE- 250/636: Performed by: PHYSICIAN ASSISTANT

## 2023-01-24 RX ORDER — ACETAMINOPHEN 650 MG/1
650 SUPPOSITORY RECTAL
Qty: 5 SUPPOSITORY | Refills: 0 | OUTPATIENT
Start: 2023-01-24 | End: 2023-01-25 | Stop reason: SDUPTHER

## 2023-01-24 RX ORDER — CLONIDINE HYDROCHLORIDE 0.1 MG/1
0.1 TABLET ORAL 3 TIMES DAILY
Status: CANCELLED | OUTPATIENT
Start: 2023-01-24

## 2023-01-24 RX ORDER — HYDRALAZINE HYDROCHLORIDE 20 MG/ML
10 INJECTION INTRAMUSCULAR; INTRAVENOUS
Status: DISCONTINUED | OUTPATIENT
Start: 2023-01-24 | End: 2023-01-24 | Stop reason: HOSPADM

## 2023-01-24 RX ORDER — MORPHINE SULFATE 20 MG/ML
5-10 SOLUTION ORAL
Qty: 30 ML | Refills: 0 | OUTPATIENT
Start: 2023-01-24 | End: 2023-01-25 | Stop reason: SDUPTHER

## 2023-01-24 RX ORDER — HYOSCYAMINE SULFATE 0.12 MG/1
0.12 TABLET SUBLINGUAL
Qty: 10 TABLET | Refills: 0 | OUTPATIENT
Start: 2023-01-24 | End: 2023-01-25 | Stop reason: SDUPTHER

## 2023-01-24 RX ORDER — FACIAL-BODY WIPES
10 EACH TOPICAL
Qty: 4 SUPPOSITORY | Refills: 0 | OUTPATIENT
Start: 2023-01-24 | End: 2023-01-25 | Stop reason: SDUPTHER

## 2023-01-24 RX ORDER — LORAZEPAM 2 MG/ML
1-2 CONCENTRATE ORAL
Qty: 30 ML | Refills: 0 | OUTPATIENT
Start: 2023-01-24 | End: 2023-01-25 | Stop reason: SDUPTHER

## 2023-01-24 RX ADMIN — GABAPENTIN 600 MG: 300 CAPSULE ORAL at 13:28

## 2023-01-24 RX ADMIN — ARIPIPRAZOLE 5 MG: 5 TABLET ORAL at 11:20

## 2023-01-24 RX ADMIN — PANTOPRAZOLE SODIUM 40 MG: 40 INJECTION, POWDER, FOR SOLUTION INTRAVENOUS at 10:49

## 2023-01-24 RX ADMIN — LORAZEPAM 1 MG: 1 TABLET ORAL at 13:28

## 2023-01-24 RX ADMIN — SODIUM CHLORIDE, PRESERVATIVE FREE 10 ML: 5 INJECTION INTRAVENOUS at 13:29

## 2023-01-24 RX ADMIN — HYDRALAZINE HYDROCHLORIDE 10 MG: 20 INJECTION INTRAMUSCULAR; INTRAVENOUS at 06:58

## 2023-01-24 RX ADMIN — LORAZEPAM 2 MG: 1 TABLET ORAL at 10:49

## 2023-01-24 RX ADMIN — GABAPENTIN 300 MG: 300 CAPSULE ORAL at 10:49

## 2023-01-24 RX ADMIN — MULTIVITAMIN 15 ML: LIQUID ORAL at 11:20

## 2023-01-24 RX ADMIN — LACTULOSE 30 ML: 20 SOLUTION ORAL at 10:49

## 2023-01-24 NOTE — PROGRESS NOTES
Noted patient already discharged back to Group home.        CM faxed Hospice Order, and Discharge Summary to [facility] with [address] to [phone], per request.               Laura Lemus RN  Case Management [phone]

## 2023-01-24 NOTE — ED NOTES
Assumed care of patient from [facility]. Patient is lying in stretcher. Alert but nonverbal, does not follow commands. PIV fluids and tube feed infusing. AM meds administered via PEG tube, flushes without resistance.  Patient has an order for discharge will notify the group home prior to arranging transport

## 2023-01-24 NOTE — PROGRESS NOTES
Noted Discharge Order. Samy Rincon with Palliative Care, Perfect Served CM, said discharge plan is for patient to return to [facility] today per patient's father, and Palliative Care spoke with Dr. Rossana Hancock. CM called patient's father [place] [phone], CM received voicemail, CM left a message that patient has a discharge order in, and CM needs to confirm Group Home address, and [facility] to contact [facility]. CM left phone number for a return call.              Afshan Ruiz, RN  Case Management [phone]

## 2023-01-24 NOTE — PROGRESS NOTES
[facility]: [phone]  [facility]: [phone]     Patient Name: Jade Bautista  YOB: 1990    Date of consult : 1/23/23  Follow up 1/24/2023   Reason for Consult: establish goals of care  Requesting Provider: Asael Oswald MD    Primary Care Physician: Ismael Damon DNP      SUMMARY:   Jade Bautista is a 28 y.o. male with a past history of profound intellectual disability, congenital blindness, multiple contractures, scoliosis, seizures, who was admitted on 1/22/2023 from [facility]  with a diagnosis of DEVIKA vs CKD and bleeding from the G Tube. Current medical issues leading to Palliative Medicine involvement include: Pt with a long term life limiting chronic disease process that warrants discussions about his goals of care. .    CHIEF COMPLAINT: non verbal and minimally interactive     HPI/SUBJECTIVE:    Pt is a 28year old severely debilitated and Intellectually disabled man who lives full time in a group home. According to the Group Home attendant patient has become more lethargic and less interactive than usual and developed some nasal congestion sending him to the ED. He was found to have blood loss anemia requiring a transfusion and was found to have elevated creatinine levels with a low EGFR.     1/24/2023 very debilitated young man on stretcher. Currently calm, however has sitter had been agitated but difficult situation, at group home he prefers staying on the floor with his sock as a comfort.      The patient is:   [] Verbal and participatory  [x] Non-participatory due to: non verbal     GOALS OF CARE:  Patient/Health Care Proxy Stated Goals: Prolong life      TREATMENT PREFERENCES:   Code Status: DNR         PALLIATIVE DIAGNOSES:   Goals of care/ACP  Blood loss anemia  Profound MR  Debility    Please note patient seen at 0930 1/24/2023    PLAN:   1/24/2023 Aleta Alexander seen along with Ms Mckeon, UMA. He remains in the ER. Sitter at bedside. Very debilitated young man who currently is calm and appears comfortable. We spoke with his father Mr Kenneth Chen via phone. He shared he would really like for Cristiano to return to the group home ( [facility]) today. He fells he is very uncomfortable and would be much more comfortable at the group home which is his home. Discussed with attending, patient is stable, GI has signed off, hgb 9.5. He will d/c back to facility today. 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 Goals of care affirmed as DNR/DNI. DDNR sent to father for docusign. ( Please see below for previous notes per palliative team)     Goals of care/ACP  This NP along with Guicho Connelly LMSW and Freida Goldmann RN in to meet with the patient at the bedside, he was accompanied by one of the nurses from the group home who was able to give us some background information on the patient's baseline functional status. Pt is not able to participate in any goals of care discussion. We reached out to the patient's father Kenneth Chen who was clear that he is ok with any treatments or tests that are non invasive and will serve to help his son but does not want any procedures that cause suffering or only serve to prolong his life without improving quality. He confirmed that he wants Roma David to be a DNR/DNI and is ok with a referral for evaluation from hospice. Goals of care: DNR/DNI (need to obtain a POST)   2.   Blood loss anemia  On admission came in with a HGB of 6.6 and HCT of 18.4 received PRBC. Found to have a bleed from the G Tube but patient's father does not want an EGD performed only treat symptomatically  3. Profound MR  Pt with very low level intellectual function combined with blindness.    4.   Debility  Patient's palliative performance score is around 20% times completely bedbound at this time able to do any sort of activity, his total care for his self-care and functional ADLs and he is fed by feeding tube he has drowsy and minimally interactive level of consciousness. 5.   Initial consult note routed to primary continuity provider  6. Communicated plan of care with: Palliative IDT      Advance Care Planning:  [] [facility] Interdisciplinary Team has updated the ACP Navigator with [address] and Patient Capacity    Primary Decision Maker ([address]):     Medical Interventions: Limited additional interventions   Other Instructions:   Artificially Administered Nutrition: Feeding tube long-term, if indicated     As far as possible, the palliative care team has discussed with patient / health care proxy about goals of care / treatment preferences for patient.          HISTORY:     History obtained from: family interview and chart review   Principal Problem:    DEVIKA (acute kidney injury) ([address] (1/22/2023)    Active Problems:    Requires daily assistance for activities of daily living (ADL) and comfort needs (6/14/2022)      Gastrointestinal tube present ([address] (6/14/2022)      Legally blind (6/14/2022)      Nonverbal (6/14/2022)      Seizures ([address] (6/14/2022)      Anemia (1/22/2023)      GIB (gastrointestinal bleeding) (1/23/2023)      Congenital abnormalities (1/23/2023)      Goals of care, counseling/discussion (1/23/2023)      Intellectual disability (1/23/2023)    Past Medical History:   Diagnosis Date    Anxiety     Bowel and bladder incontinence 6/14/2022    Congenital blindness     Congenital deformity of eyelid     Contracture of joint of both feet 6/14/2022    Contracture of joint of both hands     Contracture of knee     bilateral    Dupuytren's contracture of both hands 6/14/2022    Eczema     Environmental and seasonal allergies 6/14/2022    Frequent UTI 6/14/2022    Gastrointestinal tube present ([address] 6/14/2022    GERD (gastroesophageal reflux disease)     Gynecomastia     bilateral    History of laser refractive surgery     Nonverbal 6/14/2022    Peter's anomaly     Profound intellectual disability     Requires daily assistance for activities of daily living (ADL) and comfort needs 6/14/2022    Scoliosis     Seizures ([address] 6/14/2022      Past Surgical History:   Procedure Laterality Date    HX MYRINGOTOMY        History reviewed. No pertinent family history. History reviewed, no pertinent family history.   Social History     Tobacco Use    Smoking status: Never    Smokeless tobacco: Never   Substance Use Topics    Alcohol use: Never     Allergies   Allergen Reactions    Erythromycin Unknown (comments)    Risperidone Unknown (comments)    Sulfisoxazole Unknown (comments)    Suprax [Cefixime] Unknown (comments)      Current Facility-Administered Medications   Medication Dose Route Frequency    hydrALAZINE (APRESOLINE) 20 mg/mL injection 10 mg  10 mg IntraVENous Q6H PRN    gabapentin (NEURONTIN) capsule 600 mg  600 mg Per G Tube BID    gabapentin (NEURONTIN) capsule 900 mg  900 mg Per G Tube QHS    lactulose (CHRONULAC) 10 gram/15 mL solution 30 mL  20 g Per G Tube DAILY    LORazepam (ATIVAN) tablet 2 mg  2 mg Per G Tube BID    LORazepam (ATIVAN) tablet 1 mg  1 mg Per G Tube DAILY WITH LUNCH    multivitamin-minerals-ferrous gluconate oral liquid 15 mL  15 mL Per G Tube DAILY    0.9% sodium chloride infusion 250 mL  250 mL IntraVENous PRN    ARIPiprazole (ABILIFY) tablet 5 mg  5 mg Per G Tube DAILY    pantoprazole (PROTONIX) 40 mg in 0.9% sodium chloride 10 mL injection  40 mg IntraVENous Q12H    sodium chloride (NS) flush 5-40 mL  5-40 mL IntraVENous Q8H    sodium chloride (NS) flush 5-40 mL  5-40 mL IntraVENous PRN    acetaminophen (TYLENOL) tablet 650 mg  650 mg Oral Q6H PRN    Or    acetaminophen (TYLENOL) suppository 650 mg  650 mg Rectal Q6H PRN    polyethylene glycol (MIRALAX) packet 17 g  17 g Oral DAILY PRN    bisacodyL (DULCOLAX) suppository 10 mg  10 mg Rectal DAILY PRN    ondansetron (ZOFRAN ODT) tablet 4 mg  4 mg Oral Q8H PRN    Or    ondansetron (ZOFRAN) injection 4 mg  4 mg IntraVENous Q6H PRN    lactated Ringers infusion  75 mL/hr IntraVENous CONTINUOUS     Current Outpatient Medications   Medication Sig    morphine (ROXANOL) 100 mg/5 mL (20 mg/mL) concentrated solution Take 0.25-0.5 mL by mouth every two (2) hours as needed for Pain for up to 30 days. Max Daily Amount: 120 mg. LORazepam (INTENSOL) 2 mg/mL concentrated solution Take 0.5-1 mL by mouth every eight (8) hours as needed for Anxiety, Agitation or Restlessness. Max Daily Amount: 6 mg.    hyoscyamine SL (Levsin/SL) 0.125 mg SL tablet 1 Tablet by SubLINGual route every six (6) hours as needed for Secretions. bisacodyL (DULCOLAX) 10 mg supp Insert 10 mg into rectum daily as needed for Constipation. acetaminophen (TYLENOL) 650 mg suppository Insert 1 Suppository into rectum every six (6) hours as needed for Fever. ARIPiprazole (ABILIFY) 5 mg tablet 5 mg by Per G Tube route daily. cranberry fruit extract (cranberry extract) 500 mg tab Take 1 Tablet by mouth daily. dextromethorphan-guaiFENesin (Robitussin Cough-Chest Anselmo DM)  mg cap As needed on label as needed  Indications: cough    LORazepam (ATIVAN) 2 mg tablet Administer 1 tablet every morning (8 am), 1/2 tablet at noon, and 1 tablet at bedtime per G-TUBE for agitation. ascorbic acid, vitamin C, (VITAMIN C) 500 mg tablet Take  by Gtube. Administer 1 packet of vitamin C mg with Vitamin D and Zinc mixed with 4 - 8oz of water daily as needed    polyethylene glycol (MIRALAX) 17 gram/dose powder 17 g by Per G Tube route daily as needed for Constipation. loratadine (CLARITIN) 10 mg tablet 0.5 Tablets by Per G Tube route daily as needed for Allergies. lactulose (CHRONULAC) 10 gram/15 mL solution Take 30 mL by mouth daily. cloNIDine HCL (CATAPRES) 0.1 mg tablet 1 Tablet by Per G Tube route three (3) times daily.     nutritional supplements (Osmolite 1.5 Navi) 0.06 gram-1.5 kcal/mL liqd Administer 1 can per G-TUBE 4 times daily (8am, 12 pm, 4 pm, 8 pm) for nutrition. fluticasone propionate (FLONASE) 50 mcg/actuation nasal spray 2 Sprays by Both Nostrils route daily as needed for Rhinitis.    gabapentin (NEURONTIN) 600 mg tablet TAKE 1 TABLET BY MOUTH IN THE MORNING AT 8AM, TAKE 1 TABLET AT NOON THEN 1 AND 1/2 TABLETS AT BEDTIME FOR SEIZURES    melatonin 5 mg tablet Take 5 mg by mouth nightly. 1 tablet at bedtime per G-tube    famotidine (PEPCID) 20 mg tablet 20 mg by Per G Tube route daily. Ostomy Supplies powd Apply  to affected area as needed. ACETAMINOPHEN PO by Gastrostomy Tube route. white petrolatum (Desitin Multi-Purpose) 71.3 % oint by Apply Externally route. Menthol/Camphor/Phen/Salicylic (LIP BALM MEDICATED EX) by Apply Externally route. Clinical Pain Assessment (nonverbal scale for nonverbal patients): Clinical Pain Assessment  Severity: 0     Activity (Movement): Lying quietly, normal position    Duration: for how long has pt been experiencing pain (e.g., 2 days, 1 month, years)  Frequency: how often pain is an issue (e.g., several times per day, once every few days, constant)     FUNCTIONAL ASSESSMENT:     Palliative Performance Scale (PPS):  PPS: 30    ECOG  ECOG Status : Completely disabled     PSYCHOSOCIAL/SPIRITUAL SCREENING:      Any spiritual / Episcopal concerns:  [] Yes /  [x] No    Caregiver Burnout:  [] Yes /  [] No /  [x] No Caregiver Present      Anticipatory grief assessment:   [x] Normal  / [] Maladaptive        REVIEW OF SYSTEMS:     Systems: constitutional, ears/nose/mouth/throat, respiratory, gastrointestinal, genitourinary, musculoskeletal, integumentary, neurologic, psychiatric, endocrine. Positive findings noted below. Modified ESAS Completed by: provider           Pain: 0           Dyspnea: 0               Positive and pertinent negative findings in ROS are noted above in HPI.   The following systems were [x] reviewed / [] unable to be reviewed as noted in HPI  Other findings are noted below. PHYSICAL EXAM:     Constitutional: calm lying on stretcher in NAD   Eyes: blind   ENMT: poor dentition   Cardiovascular: regular rhythm  Respiratory: respirations not labored   Gastrointestinal: soft non-tender, PEG in situ  Musculoskeletal: contracted   Skin: warm, dry  Neurologic: no command following     Other: Wt Readings from Last 3 Encounters:   01/22/23 40.8 kg (90 lb)   10/11/22 40.8 kg (90 lb)   09/30/22 40.8 kg (90 lb)     Blood pressure (!) 150/88, pulse 88, temperature 98.1 °F (36.7 °C), resp. rate 18, height 4' 10\" (1.473 m), weight 40.8 kg (90 lb), SpO2 93 %. Pain:  Pain Scale 1: FACES                         LAB AND IMAGING FINDINGS:     Lab Results   Component Value Date/Time    WBC 14.3 (H) 01/24/2023 04:45 AM    HGB 9.5 (L) 01/24/2023 04:45 AM    PLATELET 542 55/50/5097 04:45 AM     Lab Results   Component Value Date/Time    Sodium 146 (H) 01/24/2023 04:45 AM    Potassium 3.7 01/24/2023 04:45 AM    Chloride 115 (H) 01/24/2023 04:45 AM    CO2 23 01/24/2023 04:45 AM    BUN 56 (H) 01/24/2023 04:45 AM    Creatinine 3.45 (H) 01/24/2023 04:45 AM    Calcium 8.7 01/24/2023 04:45 AM    Phosphorus 3.8 01/24/2023 04:45 AM      Lab Results   Component Value Date/Time    Alk.  phosphatase 71 01/22/2023 07:51 PM    Protein, total 5.0 (L) 01/22/2023 07:51 PM    Albumin 2.3 (L) 01/24/2023 04:45 AM    Globulin 2.8 01/22/2023 07:51 PM     No results found for: INR, PTMR, PTP, PT1, PT2, APTT, INREXT, INREXT   Lab Results   Component Value Date/Time    Iron 127 01/22/2023 07:51 PM    TIBC 221 (L) 01/22/2023 07:51 PM    Iron % saturation 57 (H) 01/22/2023 07:51 PM      No results found for: PH, PCO2, PO2  No components found for: GLPOC   No results found for: CPK, CKMB           Total time: 35 minutes   Counseling / coordination time, spent as noted above:   > 50% counseling / coordination:  Time spent in direct consultation with the patient, medical team, and family     Prolonged service was provided for  []30 min   []75 min in face to face time in the presence of the patient, spent as noted above. Time Start:   Time End:     Disclaimer: Sections of this note are dictated using utilizing voice recognition software, which may have resulted in some phonetic based errors in grammar and contents. Even though attempts were made to correct all the mistakes, some may have been missed, and remained in the body of the document. If questions arise, please contact our department.

## 2023-01-24 NOTE — ACP (ADVANCE CARE PLANNING)
[facility] [phone]  [facility] [phone]     Palliative team received VM from pt's father,  Tonya Boyd, requesting return call. Sean Mccrary NP and this LMSW attended to pt at bedside for follow up assessment. Pt has sitter, who reports pt has been agitated and has been screaming out. Pt does not have his sock that he chews and manipulates with his hands, for comfort. Pt currently not agitated or screaming out. Sean Mccrary NP and this LMSW contacted pt's father at [phone]; he expressed great concern that pt is not able to be comfortable, as he is usually able to be on the floor with his sock and blanket. He reports he is concerned pt is suffering emotionally, by not being in his usual environment. He reports he wants him transferred back to facility and to have whatever treatment is being recommended, there by people he knows and is comfortable with. NP contacted hospitalist, Dr. Aj Montgomery MD and explained the situation to him. He reviewed pt's chart and determined pt can be transferred back to group home. Sean Mccrary NP and this LMSW contacted pt's father via telephone. Informed him pt will be Dc'd back to facility today. Goals of care were addressed, and pt's father was asked to complete DDNR to protect pt from medical procedures, his father does not wish him to undergo. He agreed to complete document. [facility] obtained email. Sean Mccrary NP signed [facility] as provider. [facility] sent DDNR via [employer] to pt's father for signature. [facility] received signed DDNR from pt's father. Copy will be placed with pt's chart and sent with pt at time of DC. Pt had been discharged prior to placing DDNR on chart. [facility] contacted pt's group home and obtained fax number. DDNR faxed, received complete transmission sheet. Thank you for this referral to Palliative Care.  The palliative care team remains available to provide support for patient and his family. Anticipate DC back to group home today.      CODE STATUS: DNR / Darlene Query, [facility]  Palliative Medicine Inpatient   [address]: [phone] ([phone])

## 2023-01-24 NOTE — HOSPICE
Anticipated hospital d/c date: 1/24/23  Discussion occurred with patient and/or family about preference of hospitalization once admitted to hospice: Yes  Reviewed and discussed hospice philosophy and keeping the patient comfortable in their residence: Yes  Discussion with patient/family regarding around the clock caregiver in place due to patient safety in the home once discharged: Yes     DME:  No DME needs but will order O2 to have on hand     Please send scripts for comfort medications home with the pt at discharge. 1. Morphine concentrate 20mg/ml  Give 0.25-1ml po/sl every 3 hours PRN for pain/air hunger  Quantity 30ml     2. Lorazepam oral solution 2mg/ml   Give 0.5mg -1ml (0.25-0.5ml) po/sl every 6 hours PRN anxiety/agitation   Quantity 30 ml     3. Hyoscyamine 0.125mg tablets  Give 1 tab po/sl every 6 hours PRN terminal congestion  Quantity 10 tabs. 4.  Bisacodyl Suppository 10mg  Give one suppository rectally every day PRN  Quantity 5 suppositories     5. Acetaminophen Suppository 650mg  Give one suppository rectally every four (4)   hours PRN  Quantity 4 suppositories     Thank you for the referral to [facility]. If we can be of further assistance please contact [phone].     Doris Johnson MDIV, BSN, RN  Hospice Liaison  [facility]  [address] [facility] [address], [address]  Office: [phone]  Fax: [phone]  Email: [email or web address]

## 2023-01-24 NOTE — HOSPICE
[address] [facility] with patient's father Hernando Arroyo via phone. Discussed [facility] philosophy, services, criteria, and IDT. Discussed caregiver need for round the clock care with Mr. Philomena Deshpande. Primary caregiver identified as Leidy Perry. Caregiver concerns identified as n/a. Answered all questions. Provided with 24/7 contact information. Mr. Philomena Deshpande is agreeable to hospice services. He requested to speak with provider about patient's prognosis and test results. Liaison relayed information to Attending as requested. [facility] will admit at discharge. Liaison called [place] [address] for [facility], informed her of father's decision to admit patient to hospice at discharge. She voiced understanding. No DME needs reported. [address] referral received. Chart review in process. Thank you for the referral to [facility]. If we can be of further assistance please contact [phone].     Everton Sapp MDIV, BSN, RN  Hospice Liaison  [facility]  [address]., [facility] [address], [address]  Office: [phone]  Fax: [phone]  Email: [email or web address]

## 2023-01-24 NOTE — PROGRESS NOTES
[email or web address]  [phone]    Gastroenterology follow up-Progress note    Impression:  1. Acute on chronic anemia  -H/H on admission 6.6/18.4 now 9.5/26.2 s/p 1 unit PRBC  -B12/folate/iron all normal  -Occult stool negative  2. Acute renal failure  3. Congential abnormalities, profound intellectual disability  4. Hx of seizures    Plan:  1. Monitor H/H and transfuse as per protocol  2. Continue BID PPI  3. No EGD planned, no invasive procedures per father at palliative care meeting  4. Will sign off-Thank you for this consultation and the opportunity to participate in the care of this patient. Please do not hesitate to call with any questions or concerns, or should event occur that may necessitate additional GI evaluation.       Chief Complaint: non-verbal at baseline, admitted for UTI and reported possible bloody drainage from G-tube      Subjective:  No bleeding seen from G tube per notes, multiple brown stools, tolerating tube feeds      Cardiovascular: heart normal, normal rate and regular rhythm   Pulmonary/Chest Wall: breath sounds normal and effort normal   Abdominal: appearance normal, bowel sounds normal and soft, non-acute, non-tender, G tube with no drainage and TF running     Patient Active Problem List   Diagnosis Code    Requires daily assistance for activities of daily living (ADL) and comfort needs Z74.1    Gastrointestinal tube present ([facility] Z93.1    Legally blind H54.8    Nonverbal R47.01    Frequent UTI N39.0    Bowel and bladder incontinence R32, R15.9    Environmental and seasonal allergies J30.89    Seizures (HCC) R56.9    Dupuytren's contracture of both hands M72.0    Contracture of joint of both feet M24.574, M24.575    COVID-19 virus infection U07.1    Hospital discharge follow-up Z09    Anemia D64.9    DEVIKA (acute kidney injury) ([facility] N17.9    GIB (gastrointestinal bleeding) K92.2    Congenital abnormalities Q89.9    Goals of care, counseling/discussion Z71.89    Intellectual disability F79         Visit Vitals  BP (!) 196/100 (BP 1 Location: Left leg)   Pulse 84   Temp 98.1 °F (36.7 °C)   Resp 20   Ht 4' 10\" (1.473 m)   Wt 40.8 kg (90 lb)   SpO2 96%   BMI 18.81 kg/m²         No intake or output data in the 24 hours ending 01/24/23 1104    CBC w/Diff    Lab Results   Component Value Date/Time    WBC 14.3 (H) 01/24/2023 04:45 AM    RBC 2.67 (L) 01/24/2023 04:45 AM    HGB 9.5 (L) 01/24/2023 04:45 AM    HCT 26.2 (L) 01/24/2023 04:45 AM    MCV 98.1 01/24/2023 04:45 AM    MCH 35.6 (H) 01/24/2023 04:45 AM    MCHC 36.3 01/24/2023 04:45 AM    RDW 16.0 (H) 01/24/2023 04:45 AM     01/24/2023 04:45 AM    Lab Results   Component Value Date/Time    GRANS 63 01/22/2023 07:51 PM    LYMPH 22 01/22/2023 07:51 PM    EOS 5 01/22/2023 07:51 PM    BASOS 1 01/22/2023 07:51 PM      Basic Metabolic Profile   Recent Labs     01/24/23  0445   *   K 3.7   *   CO2 23   BUN 56*   CA 8.7   PHOS 3.8        Hepatic Function    Lab Results   Component Value Date/Time    ALB 2.3 (L) 01/24/2023 04:45 AM    TP 5.0 (L) 01/22/2023 07:51 PM    AP 71 01/22/2023 07:51 PM    No results found for: TBIL       Coags   No results for input(s): PTP, INR, APTT, INREXT in the last 72 hours. Erik Hutchins NP    [facility]. [email or web address]  Phone: [phone]  Pager: [phone]

## 2023-01-24 NOTE — DISCHARGE SUMMARY
Discharge Summary    Patient: Cezar Hanson MRN: [id number]  CSN: [id number]    YOB: 1990  Age: 28 y.o. Sex: male    DOA: 1/22/2023 LOS:  LOS: 2 days   Discharge Date: 1/24/2023     Admission Diagnoses: Anemia [D64.9]  DEVIKA (acute kidney injury) ([facility] [N17.9]    Discharge Diagnoses:    Dehydration/volume depletion  DEVIKA secondary to above  Anemia  Concern for GI bleed status post transfusion 1 unit PRBCs  Seizure disorder  Profound intellectual disability or contractures    Discharge Condition: Stable    PHYSICAL EXAM  Visit Vitals  BP (!) 182/102 (BP 1 Location: Left upper arm, BP Patient Position: Semi fowlers)   Pulse 79   Temp 98.1 °F (36.7 °C)   Resp 21   Ht 4' 10\" (1.473 m)   Wt 40.8 kg (90 lb)   SpO2 93%   BMI 18.81 kg/m²       General: NAD. Skin: warm, dry. Eyes: sclera is non-icteric, no drainage. HENT: normocephalic/atraumatic. Respiratory: CTA with no signs of respiratory distress  Cardiovascular: RRR, no m/r/g, no cyanosis or peripheral edema of extremities. GI: soft, non-tender, + bowel sounds, G tube in LUQ with mild surrounding erythema  MSK: contractures noted of all four extremities, low muscle mass in b/l lower legs  Neuro: moving upper extremities, no focal deficits, nonverbal       Hospital Course:   See admission H&P for full details of HPI. Patient admitted to the hospital after presenting from a local group home facility for evaluation of respiratory congestion and foul-smelling urine. Patient is severely intellectually disabled and has nonverbal at baseline. He was found to be dehydrated with acute kidney injury. IV fluids were initiated. Patient was transfused 1 unit of PRBCs and a GI evaluation was obtained. No further no acute intervention was recommended from a GI perspective and management has been expectant. Blood counts have remained stable and there has been no evidence for any active/acute bleeding of a GI source.   Palliative medicine evaluation was obtained. Per discussion with family, patient's father does not want any significant interventions or heroic measures performed and wants the patient to be discharged back to his group home. Creatinine remains elevated and will maintain IV hydration until patient is discharged. Recommend increase in fluids via PEG at discharge. Care management has stated that group home requests an order for hospice evaluation. Patient is medically stable to return to the group home today for continued care. Consults:   Gastroenterology  Palliative medicine    Significant Diagnostic Studies/Procedures:   CXR:  COMPARISON: 11/22/2021  FINDINGS: No pneumothorax identified. The lungs are clear. No infiltrates  appreciated. No effusions identified. The cardiomediastinal silhouette is  unremarkable. The pulmonary vasculature is unremarkable. The osseous  structures are unremarkable. IMPRESSION  1. No acute cardiopulmonary process. CT abdomen pelvis:  IMPRESSION  Gastrostomy tube in satisfactory position. Calcifications in the bilateral kidneys without hydronephrosis. This may  represent either medullary nephrocalcinosis or nonobstructing renal stones. No definite acute bowel abnormality. Discharge Medications:     Current Discharge Medication List        CONTINUE these medications which have NOT CHANGED    Details   ARIPiprazole (ABILIFY) 5 mg tablet 5 mg by Per G Tube route daily. cranberry fruit extract (cranberry extract) 500 mg tab Take 1 Tablet by mouth daily. Qty: 90 Tablet, Refills: 3    Associated Diagnoses: Frequent UTI      dextromethorphan-guaiFENesin (Robitussin Cough-Chest Anselmo DM)  mg cap As needed on label as needed  Indications: cough  Qty: 118 Capsule, Refills: 5      LORazepam (ATIVAN) 2 mg tablet Administer 1 tablet every morning (8 am), 1/2 tablet at noon, and 1 tablet at bedtime per G-TUBE for agitation.   Qty: 75 Tablet, Refills: 5    Associated Diagnoses: Agitation      ascorbic acid, vitamin C, (VITAMIN C) 500 mg tablet Take  by Gtube. Administer 1 packet of vitamin C mg with Vitamin D and Zinc mixed with 4 - 8oz of water daily as needed  Qty: 90 Tablet, Refills: 11      polyethylene glycol (MIRALAX) 17 gram/dose powder 17 g by Per G Tube route daily as needed for Constipation. Qty: 289 g, Refills: 11      loratadine (CLARITIN) 10 mg tablet 0.5 Tablets by Per G Tube route daily as needed for Allergies. Qty: 30 Tablet, Refills: 11      lactulose (CHRONULAC) 10 gram/15 mL solution Take 30 mL by mouth daily. Qty: 480 mL, Refills: 11      cloNIDine HCL (CATAPRES) 0.1 mg tablet 1 Tablet by Per G Tube route three (3) times daily. Qty: 270 Tablet, Refills: 3    Associated Diagnoses: Agitation      nutritional supplements (Osmolite 1.5 Navi) 0.06 gram-1.5 kcal/mL liqd Administer 1 can per G-TUBE 4 times daily (8am, 12 pm, 4 pm, 8 pm) for nutrition. Qty: 1 Box, Refills: 11      fluticasone propionate (FLONASE) 50 mcg/actuation nasal spray 2 Sprays by Both Nostrils route daily as needed for Rhinitis. Qty: 1 Each, Refills: 5    Associated Diagnoses: Environmental and seasonal allergies      gabapentin (NEURONTIN) 600 mg tablet TAKE 1 TABLET BY MOUTH IN THE MORNING AT 8AM, TAKE 1 TABLET AT NOON THEN 1 AND 1/2 TABLETS AT BEDTIME FOR SEIZURES  Qty: 315 Tablet, Refills: 0    Associated Diagnoses: Seizures (HCC)      melatonin 5 mg tablet Take 5 mg by mouth nightly. 1 tablet at bedtime per G-tube      famotidine (PEPCID) 20 mg tablet 20 mg by Per G Tube route daily. Ostomy Supplies powd Apply  to affected area as needed. ACETAMINOPHEN PO by Gastrostomy Tube route. white petrolatum (Desitin Multi-Purpose) 71.3 % oint by Apply Externally route. Menthol/Camphor/Phen/Salicylic (LIP BALM MEDICATED EX) by Apply Externally route.            STOP taking these medications       ibuprofen (MOTRIN) 400 mg tablet Comments:   Reason for Stopping:               Activity: As tolerated. Diet: Tube feeds as previously prescribed. Disposition:  Group home facility. Follow-up: with PCP in 1 week. Minutes spent on discharge: >30 minutes spent coordinating this discharge. Thang Otto MD  [address] [place] [address]    Disclaimer: Sections of this note are dictated using utilizing voice recognition software. Minor typographical errors may be present. If questions arise, please do not hesitate to contact me or call our department.

## 2023-01-24 NOTE — PROGRESS NOTES
Patient's father Westley Bamberger [phone] called CM back, verified [employer] [address] address is the correct address on the facesheet, and the CM at [address] [place] [address] Place contact is Sirena Roldan [phone]. CM called Sirena Roldan [phone], went to voicemail, mailbox is full. CM called [employer] main phone number, [phone] # [phone], for Sirena Roldan, went to voicemail, CM left a message that Dr is trying to discharge patient back to Group home today. CM left phone number for a return call. Светлана Chiu Neighbor called CM back, said they will need a Hospice order, and Discharge Summary, and CM to fax to [phone], and then ED can setup transport to [facility] today for discharge. Daniel Slaughter, and updated that Group home requesting Hospice order, and Discharge Summary, CM to fax to [facility], then ED can setup transport to [facility].                Lexii Holder, MICHELA  Case Management [phone]

## 2023-01-24 NOTE — ED NOTES
Patient cleansed of moderate stool incontinence. [place] catheter removed, roney care provided and fresh brief placed on patient.

## 2023-01-25 ENCOUNTER — HOME CARE VISIT (OUTPATIENT)
Dept: SCHEDULING | Facility: HOME HEALTH | Age: 33
End: 2023-01-25
Payer: MEDICAID

## 2023-01-25 VITALS — RESPIRATION RATE: 18 BRPM | TEMPERATURE: 98.3 F

## 2023-01-25 PROCEDURE — 0651 HSPC ROUTINE HOME CARE

## 2023-01-25 PROCEDURE — HOSPICE MEDICATION HC HH HOSPICE MEDICATION

## 2023-01-25 PROCEDURE — G0300 HHS/HOSPICE OF LPN EA 15 MIN: HCPCS

## 2023-01-25 RX ORDER — FACIAL-BODY WIPES
10 EACH TOPICAL
Qty: 4 SUPPOSITORY | Refills: 0 | Status: SHIPPED | OUTPATIENT
Start: 2023-01-25

## 2023-01-25 RX ORDER — ACETAMINOPHEN 650 MG/1
650 SUPPOSITORY RECTAL
Qty: 5 SUPPOSITORY | Refills: 0 | Status: SHIPPED | OUTPATIENT
Start: 2023-01-25

## 2023-01-25 RX ORDER — HYOSCYAMINE SULFATE 0.12 MG/1
0.12 TABLET SUBLINGUAL
Qty: 10 TABLET | Refills: 0 | Status: SHIPPED | OUTPATIENT
Start: 2023-01-25

## 2023-01-25 RX ORDER — MORPHINE SULFATE 20 MG/ML
5-10 SOLUTION ORAL
Qty: 30 ML | Refills: 0 | Status: SHIPPED | OUTPATIENT
Start: 2023-01-25 | End: 2023-02-24

## 2023-01-25 RX ORDER — LORAZEPAM 2 MG/ML
1-2 CONCENTRATE ORAL
Qty: 30 ML | Refills: 0 | Status: SHIPPED | OUTPATIENT
Start: 2023-01-25

## 2023-01-25 NOTE — HOSPICE
Patient presents lying in the fetal position on his mattress no signs of acute distress noted. No new questions or concerns per staff. Pain:No signs of pain    Medication refills: Awaiting comfort kit    Medications reconciled and all medications are not available in the home this visit. The following education was provided regarding medications, medication interactions, and look a like medications: Medication side effects, dosages, purposes, frequencies. Response to teaching: Verbalized understanding. Medications  are effective at this time. Supplies by type and quantity ordered/delivered this visit include: Kangar[employer] bags and [employer] attending physician regarding: Not needed    Instructed patient and family on 24-hour hospice availability and phone number.

## 2023-01-25 NOTE — HOSPICE
Upon arrival to [facility], patient is situated on a mattress in fetal position, face down, with blanket pulled over his head - which is \"normal\" for him, per facility staff. Patient is moderately receptive to me as he is legally blind and \"doesn't take to strangers well\" per staff. Patient is soothed by staff voice. He does not follow commands but he is not combative. Patient will intermittently turn his head towards the sound of staff calling his name. He does not do this every time. Lungs are clear in upper lobes bilaterally, but hard to assess lower lobes as patient repeatedly grabs my hand and moves it off his chest in protest. He remains calm but it's clear he is not very comfortable with me. Respirations are even and unlabored at 19, oxygen saturation is 99% on room air. During assessment, I assissted the staff nurse in cleaning the patient and applying a new brief. Abdomen is soft, non-distended. Patient has a G-tube present in upper left abdominal quadrant (more midline) that is intact, patent (per staff) and shows no s/s of infection. Nurse Edelmira Latif administered patient's Osmolite throught the G-tube without difficulty and patient tolerated it well. Bowel sounds are audilbe in all quadrants. Patient had a small liquidy bowel movement during admission. He is incontinent of bowel and bladder. He tolerates staff cleaning him well but remains in fetal position even when he is on his back. Patients legs have limited ROM and his feet are contracted with clubbing noted to toes. Patient's hands are also contracted and clubbing observed as well. There is no edmea noted to upper or lower extremities. Patient has a stage 1 DTI not to right heel that staff is trying to protect with foam bandages. The staff will reposition patient to releave pressure but he will automatically move himself back into fetal position where his heels have constant pressure against his thighs/buttocks.  As we completed the assessment, we repositioned him on the mattress and within minutes he was back in fetal position, face-down in the mattress with his blanket over his head in no acute distress. Hospice Admission Summary    Mr. Eris Molina, 80-year-old male, admitted to Hospice services for a terminal diagnosis of intellectual disability. Patient has elected hospice services and is no longer seeking aggressive treatment. Co-morbidities related to the terminal diagnosis are dysphagia, seizures and debility. Patient also has a past medical history of blindness, G-tube, nonverbal, frequent UTI, seizures, Dupuytren's contracture of both hands, clubbing of fingernails/toenails, DEVIKA, COVID-19, and contracture of both feet. Mr. Virgilio Posey was most recently admitted to the hospital with history of electro disability, G-tube dependent, bowel incontinence, functionally blind, nonverbal, contractures, portal hypertension, hyperammonemia, portal vein thrombosis, presents emergency department with a complaint of increasing congestion for the last 2 days as well as foul-smelling urine and mild leakage around his Hector G-tube. History is provided by staff from the [address] [place] [address] this with the patient and the patient typically likes to lay down and has been having a difficult time because he gets more congested when he does so. Patient was treated for symptomatic anemia and acute kidney injury. Nurse Krzysztof Borja, group home staff, is present and willing and safely able to provide care and administer medications daily. Nurse Judith Amos and additional group home staff participated in goal setting, care planning, and are agreeable to the care plan. Admission booklet reviewed with Judith Amos and facility staff; services provided under hospice benefit, review of rights and responsibilities, disposal of medications, contact information for , Joint Commission, Medicare, QIO, and outside resources for independence.   The group home staff was educated on IDT and their right to attend meetings. Education provided regarding 24-hour availability of hospice services and on-call number provided. Attending physician: Dr Diana Curtis Director: Dr Estrada Slaughter  Level of Care: Routine  Advance Directives: DNR (in group home/on file)  Allergies: Suprax, Erythromycin, Risperidone, Sulfisoxazole  RMAC: 20.5cm  Height: 4'10  Weight: 90 lbs  PPS:  30%  FAST: N/A  NYHA:  N/A   EF%:  N/A   Tobacco usage:  N/A  Functional status: Dependent for all ADLs  Infection:  N/A   Pain: Patient is non-verbal but showed no behavioral indicators that he was experiencing pain  Bowel Regimen: Bisacodyl 10mg prn daily for constipation initiated on admission  Lines, Drains, or Airways present: G-tube/Hector  Wounds present:  Stage 1 DTI on right heel  Symptoms to monitor to maintain comfort: anxiety/pain  Hospice Aide Services Requested:  not at this time  MSW Requested: open to   Requested: open to  [employer] Requested: facility staff requesting volunteer services to maybe read with patient for 30 minutes, animal or music therapy. Whatever we may off for him. Bereavement risk/contact: No family present on admission for assessment. Patient/family/caregiver specific end of life goal: No family present on admission to discuss. Training and education provided this visit: Discussed and educated Nurse Salvatore Sharpe and [address] (nurse/) on hospice philosophy, comfort medication use, 24-hour number, 911, end of life goals. Plan for next visit: Answer any questions staff has, reassess patient for pain/anxiety. No  affiliation for patient. Unsure about father/family.

## 2023-01-26 ENCOUNTER — HOME CARE VISIT (OUTPATIENT)
Dept: SCHEDULING | Facility: HOME HEALTH | Age: 33
End: 2023-01-26
Payer: MEDICAID

## 2023-01-26 PROCEDURE — 0651 HSPC ROUTINE HOME CARE

## 2023-01-26 NOTE — HOSPICE
The following standard precautions for infection control were utilized:  Mask  Patient was on the floor laying down. Staff was by his side.  will follow up.

## 2023-01-26 NOTE — ADT AUTH CERT NOTES
Renal Failure, Acute - Care Day 1 (1/22/2023) by Jame Puri       Review Status Review Entered   Completed 1/23/2023 0914       Created By   Jame Puri      Criteria Review      Care Day: 1 Care Date: 1/22/2023 Level of Care: Inpatient Floor    Guideline Day 1    Clinical Status    (X) * Clinical Indications met    Routes    (X) Parenteral or oral hydration    1/23/2023 09:13:44 EST by Jame Puri      ED MEDS 500ml NS bolus  x1, NS infusion @ 125 hour and continuous. 2208, LR [email or web address]. (X) Parenteral or oral medications    1/23/2023 09:13:44 EST by Jame Puri      Lorazepam 1 mg IM and PO x1,- ED MEDS    Interventions    (X) Urinalysis and other urine tests, CBC, renal function tests, hepatitis B test, other laboratory tests    1/23/2023 09:13:44 EST by Jame Puri      [facility] - x2     UA   1/22/23 21:49  Bacteria: FEW    1/22/23 19:51  WBC: 6.0    (X) CXR, ECG, renal ultrasound    1/23/2023 09:13:44 EST by Jame Puri      CT Scan  IMPRESSION     Gastrostomy tube in satisfactory position. Calcifications in the bilateral kidneys without hydronephrosis. This may  represent either medullary nephrocalcinosis or nonobstructing renal stones.      No definite acute bowel abnormali    Medications    (X) Discontinue potentially nephrotoxic medications    * Milestone        Renal Failure, Acute - Clinical Indications for Admission to Inpatient Care by Jame Puri       Review Status Review Entered   Completed 1/23/2023 0908       Created By   Jame Puri      Criteria Review      Clinical Indications for Admission to Inpatient Care    Most Recent : Jame Puri Most Recent Date: 1/23/2023 09:08:02 EST    (X) Admission is indicated for  1 or more  of the following  [A] [B] (1) (2) (4) (5) (6) (7) (8)    (9):       (X) Acute [B] renal failure (stage 3 acute kidney injury), [A] as indicated by  1 or more  of       the following :          (X) 3-fold rise in serum creatinine from baseline [C] 1/23/2023 09:08:03 EST by Stephanie Cueto            1.4- 3.9          (X) Reduction of more than 75% in estimated glomerular filtration rate from baseline [C]          1/23/2023 09:08:03 EST by Stephanie Cueto            57-20   Additional Notes   DATE: 01/22         Chief Complaint/ED Presentation:   Worsening respiratory congestion and foul smelling urine       Vitals:   B/P 147/83 (BP 1 Location: Right upper arm)   Pulse   71   Temp   97.5 °F (36.4 °C)   Resp   20   Ht   4' 10\" (1.473 m)   Wt   40.8 kg (90 lb)   SpO2   97%   BMI   18.81 kg/m²      ED treatment:   Per discussion between father and ED physician, \"He does note that he does not want heroic measures for his son as his health has declined over the years. He does not want any heroics like life support or surgery at this point. He is agreeable to IV fluids, antibiotics, and blood if needed. \"       In the ED, VSS, spo2 100% on room air. Labs with hgb 6.6 (baseline 10), BUN 62, Cr 3.96 (baseline ~1.4), RVP negative. UA not c/w UTI. CXR neg. CT abd/pelv without acute abnormalities. Admission Diagnosis/Op Note:   DEVIKA      Pertinent Medical History:   28 y.o. male with a PMHx of congenital abnormalities, recurrent UTI, G tube, GERD, seizure d/o, profound intellectual disability who presented to the ED with reported worsening respiratory congestion and fouling smelling urine for the past 2 days. History was provided by staff at group home. He is nonverbal at baseline. He's been having a hard time laying flat recently because of his congestion. He has also been having possibly bloody drainage from his G tube recently. He requires total care. Group home aide at bedside states his G tube has been irritated for the past week or so. Per discussion between father and ED physician, [facility] does note that he does not want heroic measures for his son as his health has declined over the years.   He does not want any heroics like life support or surgery at this point. He is agreeable to IV fluids, antibiotics, and blood if needed. \"       In the ED, VSS, spo2 100% on room air. Labs with hgb 6.6 (baseline 10), BUN 62, Cr 3.96 (baseline ~1.4), RVP negative. UA not c/w UTI. CXR neg. CT abd/pelv without acute abnormalities. Physical Exam:   General: NAD, appears stated age, sleeping comfortably   Skin: warm, dry   Eyes: sclera is non-icteric, no drainage   HENT: normocephalic/atraumatic, dry mucus membranes   Respiratory: CTA with no signs of respiratory distress   Cardiovascular: RRR, no m/r/g, no cyanosis or peripheral edema of extremities   GI: soft, non-tender, + bowel sounds, G tube in LUQ with mild surrounding erythema   MSK: contractures noted of all four extremities, low muscle mass in b/l lower legs   Neuro: moving upper extremities, no focal deficits, nonverbal      MD Consults/Assessments & Plans:   Hospitalist:   DEVIKA: possibly dehydration due to inadequate FWFs, also NSAIDs noted in med list   - IV fluids   - monitor BMP   - strict I&Os   - avoid NSAIDs       Anemia, suspected GIB: G tube present and in good position on CT. Iron studies and b12/folate okay. Possible PUD with NSAID use. - monitor H&H   - transfusing x1 unit now   - consider GI consult in AM, although patient's father might not be interested in EGD   - BID IV PPI   - hemoccult negative.  Gastric occult ordered       Congenital abnormalities, profound intellectual disability: requires total care   - palliative care consult for goals of care, possible candidate for hospice   - nutrition consult for TF   - cont home meds       Seizure d/o   - cont home meds         Orders:   DNR

## 2023-01-27 ENCOUNTER — HOME CARE VISIT (OUTPATIENT)
Dept: SCHEDULING | Facility: HOME HEALTH | Age: 33
End: 2023-01-27
Payer: MEDICAID

## 2023-01-27 VITALS — TEMPERATURE: 98.5 F

## 2023-01-27 PROCEDURE — 0651 HSPC ROUTINE HOME CARE

## 2023-01-27 PROCEDURE — HOSPICE MEDICATION HC HH HOSPICE MEDICATION

## 2023-01-27 PROCEDURE — G0299 HHS/HOSPICE OF RN EA 15 MIN: HCPCS

## 2023-01-27 PROCEDURE — G0155 HHCP-SVS OF CSW,EA 15 MIN: HCPCS

## 2023-01-27 NOTE — HOSPICE
Reason for today's visit is to complete assessment. PT is a 28year old male, admitted to hospice on 23 with intellectual disability. Pt resided at [address] [place] [address] and is provided 24 hour care by staff. SW is greeted at the door by staff member Leslie Tay who is pt's LPN today. Home is neat and tidy, no safety concerns noted. Pt is received sitting on a fall mat. LPN reports that pt stays on a fall mat as pt does not like to be in bed or sit up in his chair. LPN states pt is back to his baseline since returning from hospital and pt has been comfortable. LPN states pt suffers with anxiety which he has medication for. LPN states pt has not shown non-verbal signs of pain. LPN reports that pt will bite on a sock, holler out, or have facial grimacing when pain is present. PT is dependant of all ADL's and LPN states tube feeding well, but pt has a leak at the site. LPN reports that are fond of pt and saddened by pt's prognosis but coping well. Pleasure activities for pt are music and socks. Staff denies any SW needs from staff. SW calls pt father Allanagapito Sánchez. Jerrell Sánchez states he too is coping well and questions if pt is truly hospice appropriate. SW provides education on hospice criteria and informs father that the nurse will also be calling him after her visit to discuss. Father states pt is very unique in his condition and pt received a stent at age 3. Father states pt has out lived what the doctors thought he would. Father states he is on his way down to see pt, as he did not come this year from [place]. Father states his only need is to establish a cremation service for pt. SW provides education and father states he will us [address]. SW provides father with  home phone number via text per his request. Father denies any other needs at this time for SW and agrees for SW to follow each month to assess emotional support needs and provide support verbalized.      Physical environment/Safety concerns: pt resides in a group home. Home is neat and tidy, no safety concerns noted  Physical function and status of pt - dependent or independent of care: Pt is received sitting on the floor on a fall mat. Staff reports that pt likes to be on the floor and several fall mats are in pts room for safety measures. Pt is dependent of all ADL's. DME needs: none  Who provides care: Staff of the [facility]  Relationship dynamics of family: father lives out of town  Cognitive function- psych or social issues: Staff reports pt has always had anxiety and is given medication to relieve anxiety  Any communication barriers: Pt is non-verbal. Staff reports pt will grimace or bite on a sock and holler if/when he is in pain. Emotional status of pt and caregivers- staff reports pt is like their son but coping will with pts prognosis  Pleasure activities of pt to provide a more pleasant environment: Pt loves music  Resource needs, respite needs: none  Thoughts on comfort care vs treatment- do they agree with hospice philosophy: Mrs. Tracee Veronica has a good understanding of pts prognosis and agrees with hospice care  Spirituality: none  End of life planning,  arrangements: SW will need to obtain from father, as Mrs. Tracee Veronica states she is unaware  Goals to provide a positive EOL experience: to keep pt as comfortable as possible  Bereavement: low for staff and father

## 2023-01-28 LAB
BACTERIA SPEC CULT: NORMAL
BACTERIA SPEC CULT: NORMAL
SERVICE CMNT-IMP: NORMAL
SERVICE CMNT-IMP: NORMAL

## 2023-01-28 PROCEDURE — 0651 HSPC ROUTINE HOME CARE

## 2023-01-29 PROCEDURE — 0651 HSPC ROUTINE HOME CARE

## 2023-01-30 ENCOUNTER — HOSPICE ADMISSION (OUTPATIENT)
Age: 33
End: 2023-01-30
Payer: COMMERCIAL

## 2023-01-30 PROCEDURE — 0651 HSPC ROUTINE HOME CARE

## 2023-01-31 ENCOUNTER — HOME CARE VISIT (OUTPATIENT)
Dept: HOSPICE | Facility: HOSPICE | Age: 33
End: 2023-01-31
Payer: MEDICAID

## 2023-01-31 ENCOUNTER — HOME CARE VISIT (OUTPATIENT)
Dept: SCHEDULING | Facility: HOME HEALTH | Age: 33
End: 2023-01-31
Payer: MEDICAID

## 2023-01-31 VITALS
HEART RATE: 90 BPM | DIASTOLIC BLOOD PRESSURE: 83 MMHG | OXYGEN SATURATION: 99 % | TEMPERATURE: 98.9 F | RESPIRATION RATE: 18 BRPM | SYSTOLIC BLOOD PRESSURE: 135 MMHG

## 2023-01-31 PROCEDURE — G0300 HHS/HOSPICE OF LPN EA 15 MIN: HCPCS

## 2023-01-31 PROCEDURE — 0651 HSPC ROUTINE HOME CARE

## 2023-02-01 PROCEDURE — 0651 HSPC ROUTINE HOME CARE

## 2023-02-01 NOTE — HOSPICE
Patient presents sitting up in bed with a cover over his head. Patient was very shy and would replace the blanket if it was moved. This nurse was able to assess patient this way and vitals were within normal limits. Patient has been receiving his nebulizer treatments and lung sounds have improved. No other concerns a this time. Medication refills: Reordered scheduled Lorazepam Confirmation #[id number]    Medications reconciled and all medications are available in the home this visit. The following education was provided regarding medications, medication interactions, and look a like medications: Medication side effects, dosages, purposes, frequencies. Response to teaching: Verbalized understanding. Medications  are effective at this time. Supplies by type and quantity ordered/delivered this visit include: None needed    Consulted attending physician regarding: Obtained refills for Lorazepam    Instructed patient and family on 24-hour hospice availability and phone number.

## 2023-02-02 ENCOUNTER — HOME CARE VISIT (OUTPATIENT)
Age: 33
End: 2023-02-02
Payer: COMMERCIAL

## 2023-02-02 VITALS
HEART RATE: 76 BPM | TEMPERATURE: 97.4 F | DIASTOLIC BLOOD PRESSURE: 64 MMHG | OXYGEN SATURATION: 96 % | SYSTOLIC BLOOD PRESSURE: 112 MMHG | RESPIRATION RATE: 18 BRPM

## 2023-02-02 PROCEDURE — 0651 HSPC ROUTINE HOME CARE

## 2023-02-02 PROCEDURE — G0299 HHS/HOSPICE OF RN EA 15 MIN: HCPCS

## 2023-02-02 ASSESSMENT — ENCOUNTER SYMPTOMS
COUGH: 1
BOWEL INCONTINENCE: 1

## 2023-02-02 NOTE — HOSPICE
Medication refills ordered this visit: None needed at this time    Medications reconciled and all medications are available in the home this visit. Education was provided regarding medications, medication interactions, and look alike medications. Response to teaching. Medications are effective at this time. Supplies by type and quantity ordered this visit include: Jevity split sponges, wipes, chux, large briefs  confirmation # [id number]    Consulted medical director/attending physician regarding: Not at this time    Instructed patient/family/caregiver on 24-hour hospice availability and phone number.     Plan for next visit:  Continued EOL education and support

## 2023-02-03 PROCEDURE — 0651 HSPC ROUTINE HOME CARE

## 2023-02-04 PROCEDURE — 0651 HSPC ROUTINE HOME CARE

## 2023-02-05 PROCEDURE — 0651 HSPC ROUTINE HOME CARE

## 2023-02-06 PROCEDURE — 0651 HSPC ROUTINE HOME CARE

## 2023-02-07 ENCOUNTER — HOME CARE VISIT (OUTPATIENT)
Age: 33
End: 2023-02-07
Payer: COMMERCIAL

## 2023-02-07 VITALS
SYSTOLIC BLOOD PRESSURE: 151 MMHG | RESPIRATION RATE: 18 BRPM | OXYGEN SATURATION: 100 % | DIASTOLIC BLOOD PRESSURE: 88 MMHG | TEMPERATURE: 99.5 F | HEART RATE: 84 BPM

## 2023-02-07 PROCEDURE — G0299 HHS/HOSPICE OF RN EA 15 MIN: HCPCS

## 2023-02-07 PROCEDURE — 0651 HSPC ROUTINE HOME CARE

## 2023-02-07 ASSESSMENT — ENCOUNTER SYMPTOMS
COUGH CHARACTERISTICS: NON-PRODUCTIVE
CONTUSION: 1
BOWEL INCONTINENCE: 1
COUGH: 1

## 2023-02-07 NOTE — HOSPICE
Medication refills ordered this visit:  Liquid ibuprofen  refilled confirmation # [id number]    Medications reconciled and all medications are available in the home this visit. Education was provided regarding medications, medication interactions, and look alike medications. Response to teaching. Medications are effective at this time. Supplies by type and quantity ordered this visit include:  briefs (large), wipes, chux, mouth swabs, calmasyn and jevity ordered Confirmation # [id number]    Consulted medical director/attending physician regarding: Escript sent to [facility] for refill from [address] [place] [address] patient/family/caregiver on 24-hour hospice availability and phone number.     Plan for next visit:  Continued EOL education and support

## 2023-02-08 ENCOUNTER — HOME CARE VISIT (OUTPATIENT)
Age: 33
End: 2023-02-08
Payer: COMMERCIAL

## 2023-02-08 PROCEDURE — 0651 HSPC ROUTINE HOME CARE

## 2023-02-09 ENCOUNTER — HOME CARE VISIT (OUTPATIENT)
Age: 33
End: 2023-02-09
Payer: COMMERCIAL

## 2023-02-09 PROCEDURE — 0651 HSPC ROUTINE HOME CARE

## 2023-02-09 PROCEDURE — G0299 HHS/HOSPICE OF RN EA 15 MIN: HCPCS

## 2023-02-09 PROCEDURE — 2500000001 HSPC NON INJECTABLE MED

## 2023-02-10 VITALS
HEART RATE: 90 BPM | TEMPERATURE: 100.1 F | RESPIRATION RATE: 20 BRPM | DIASTOLIC BLOOD PRESSURE: 86 MMHG | SYSTOLIC BLOOD PRESSURE: 149 MMHG | OXYGEN SATURATION: 98 %

## 2023-02-10 PROCEDURE — 0651 HSPC ROUTINE HOME CARE

## 2023-02-10 ASSESSMENT — ENCOUNTER SYMPTOMS
COUGH: 1
BOWEL INCONTINENCE: 1
COUGH CHARACTERISTICS: NON-PRODUCTIVE
CONTUSION: 1
STOOL DESCRIPTION: LOOSE

## 2023-02-10 NOTE — HOSPICE
Medication refills ordered this visit:  Gabapentin, Lorazepam 2mg tabs and Claritin refilled Confirmation # [id number]    Medications reconciled and all medications available in the home this visit. Education was provided regarding medications, medication interactions, and look alike medications. Response to teaching. Staff verbalized an understanding of medication regime. Medications are effective at this time. Supplies by type and quantity ordered this visit include: None needed this visit. Staff also verified that the patient dhjuhbor81 bottles of Jevity as well as his liquid Ibuprofen. Consulted medical director/attending physician regarding: Escripts for refills sent to Svetlana Ordonez NP    Instructed patient/family/caregiver on 24-hour hospice availability and phone number. Plan for next visit:  Continued education on EOL and support. Father, Tee Elisha called and updated on visits this week.

## 2023-02-11 PROCEDURE — 0651 HSPC ROUTINE HOME CARE

## 2023-02-12 PROCEDURE — 0651 HSPC ROUTINE HOME CARE

## 2023-02-13 PROCEDURE — 0651 HSPC ROUTINE HOME CARE

## 2023-02-14 PROCEDURE — 0651 HSPC ROUTINE HOME CARE

## 2023-02-15 ENCOUNTER — HOME CARE VISIT (OUTPATIENT)
Age: 33
End: 2023-02-15
Payer: COMMERCIAL

## 2023-02-15 PROCEDURE — G0299 HHS/HOSPICE OF RN EA 15 MIN: HCPCS

## 2023-02-15 PROCEDURE — 0651 HSPC ROUTINE HOME CARE

## 2023-02-16 VITALS
DIASTOLIC BLOOD PRESSURE: 57 MMHG | OXYGEN SATURATION: 98 % | TEMPERATURE: 99.3 F | SYSTOLIC BLOOD PRESSURE: 137 MMHG | HEART RATE: 96 BPM | RESPIRATION RATE: 20 BRPM

## 2023-02-16 PROCEDURE — 0651 HSPC ROUTINE HOME CARE

## 2023-02-16 ASSESSMENT — ENCOUNTER SYMPTOMS
BOWEL INCONTINENCE: 1
COUGH: 1
COUGH CHARACTERISTICS: NON-PRODUCTIVE

## 2023-02-16 NOTE — HOSPICE
Medication refills ordered this visit:  No medications needed refilled this visit    Medications reconciled and all medications are available in the home this visit.  Education was provided regarding medications, medication interactions, and look alike medications.  Response to teaching.  Residential staff verbalized an understanding of administering comfort medications as needed.   Medications are effective at this time.      Supplies by type and quantity ordered this visit include: calmoseptine, briefs, chux , wipes, jevity.  confirmation # [id number]    Consulted medical director/attending physician regarding: Not at this time    Instructed patient/family/caregiver on 24-hour hospice availability and phone number.    Plan for next visit:  Continued EOL education and support.  Weekly update call to Father Mahan.

## 2023-02-17 ENCOUNTER — HOME CARE VISIT (OUTPATIENT)
Age: 33
End: 2023-02-17
Payer: COMMERCIAL

## 2023-02-17 PROCEDURE — 0651 HSPC ROUTINE HOME CARE

## 2023-02-17 PROCEDURE — G0299 HHS/HOSPICE OF RN EA 15 MIN: HCPCS

## 2023-02-18 PROCEDURE — 0651 HSPC ROUTINE HOME CARE

## 2023-02-19 PROBLEM — Z09 HOSPITAL DISCHARGE FOLLOW-UP: Status: RESOLVED | Noted: 2023-01-01 | Resolved: 2023-01-01

## 2023-02-19 PROCEDURE — 0651 HSPC ROUTINE HOME CARE

## 2023-02-20 ENCOUNTER — HOME CARE VISIT (OUTPATIENT)
Age: 33
End: 2023-02-20
Payer: COMMERCIAL

## 2023-02-20 VITALS
HEART RATE: 98 BPM | RESPIRATION RATE: 16 BRPM | OXYGEN SATURATION: 95 % | DIASTOLIC BLOOD PRESSURE: 84 MMHG | SYSTOLIC BLOOD PRESSURE: 148 MMHG

## 2023-02-20 PROCEDURE — 0651 HSPC ROUTINE HOME CARE

## 2023-02-20 ASSESSMENT — ENCOUNTER SYMPTOMS
COUGH: 1
PAIN LOCATION - PAIN QUALITY: UNABLE TO ASSESS
BOWEL INCONTINENCE: 1

## 2023-02-20 NOTE — HOSPICE
Upon arrival patient resting in bed comfortably, patient tolerated assessment no distress, caregiver present in home, advised caregiver to call 24 hr on call number if needed, reminded caregiver of patients scheduled visits, questions/concerns answered during visit, will continue to monitor.

## 2023-02-20 NOTE — HOSPICE
The following standard precautions for infection control were utilized:  Mask  Patient was resting in bed and staff was great. They stated that he had been doing ok. Chaplain machado continue to follow.

## 2023-02-21 ENCOUNTER — HOME CARE VISIT (OUTPATIENT)
Age: 33
End: 2023-02-21
Payer: COMMERCIAL

## 2023-02-21 VITALS
DIASTOLIC BLOOD PRESSURE: 86 MMHG | HEART RATE: 88 BPM | SYSTOLIC BLOOD PRESSURE: 122 MMHG | RESPIRATION RATE: 18 BRPM | OXYGEN SATURATION: 96 % | TEMPERATURE: 98.9 F

## 2023-02-21 PROCEDURE — G0300 HHS/HOSPICE OF LPN EA 15 MIN: HCPCS

## 2023-02-21 PROCEDURE — G0155 HHCP-SVS OF CSW,EA 15 MIN: HCPCS

## 2023-02-21 PROCEDURE — 0651 HSPC ROUTINE HOME CARE

## 2023-02-21 ASSESSMENT — ENCOUNTER SYMPTOMS
COUGH: 1
BOWEL INCONTINENCE: 1
COUGH CHARACTERISTICS: NON-PRODUCTIVE

## 2023-02-22 ENCOUNTER — HOME CARE VISIT (OUTPATIENT)
Age: 33
End: 2023-02-22
Payer: COMMERCIAL

## 2023-02-22 PROCEDURE — 0651 HSPC ROUTINE HOME CARE

## 2023-02-22 NOTE — HOSPICE
Patient experiencing SOB when this burse arrived. First dose of Morphine administered by Staff Nurse. Patient has been sleeping more and breathing patterns have changed. Patient appears to be transitioning. Father Roni Cast notified of changes and verbalized understanding. Medication refills ordered this visit: Ivelisse Confirmation # [id number]    Medications reconciled and all medications are available in the home this visit. The following education was provided regarding medications, medication interactions, and look alike medications: Medication side effects, dosages, purposes, frequencies. Response to teaching: Verbalized understanding. Medications are effective at this time. Supplies by type and quantity ordered this visit include: [employer] Order Number : [id number]    Consulted medical director/attending physician regarding: Not needed    Instructed patient/family/caregiver on 24-hour hospice availability and phone number. Plan for next visit:  Continue end of life education and support caregiver.

## 2023-02-22 NOTE — HOSPICE
Reason for today's visit is to speak with father about supportive needs and provide support. SW places call to father Gonzalo Aponte. Gonzalo Aponte states he is doing well and denies any emotional support needs. Gonzalo Aponte states he did visit with pt a couple of weeks ago and reports \"he may not be exactly where he was before hospice but he's doing pretty good. \" Gonzalo Artemankita states he will be back on Sunday to visit with pt. Gonzalo Aponte confirms final arrangements of cremation. Gonzalo Fadi states he cannot think of anything SW needs for himself or pt at this time but will call hospice should any needs arise. Lorevenkat Artemankita agrees for SW to follow for supportive needs monthly.      SW updates chart from red [facility] to blue from [facility] for accuracy

## 2023-02-23 ENCOUNTER — HOME CARE VISIT (OUTPATIENT)
Age: 33
End: 2023-02-23
Payer: COMMERCIAL

## 2023-02-23 VITALS
HEART RATE: 85 BPM | SYSTOLIC BLOOD PRESSURE: 144 MMHG | OXYGEN SATURATION: 97 % | DIASTOLIC BLOOD PRESSURE: 98 MMHG | RESPIRATION RATE: 19 BRPM | TEMPERATURE: 99.8 F

## 2023-02-23 PROCEDURE — 2500000001 HSPC NON INJECTABLE MED

## 2023-02-23 PROCEDURE — G0299 HHS/HOSPICE OF RN EA 15 MIN: HCPCS

## 2023-02-23 PROCEDURE — 0651 HSPC ROUTINE HOME CARE

## 2023-02-23 ASSESSMENT — ENCOUNTER SYMPTOMS
COUGH CHARACTERISTICS: NON-PRODUCTIVE
BOWEL INCONTINENCE: 1
COUGH: 1

## 2023-02-24 PROCEDURE — 2500000001 HSPC NON INJECTABLE MED

## 2023-02-24 PROCEDURE — 0651 HSPC ROUTINE HOME CARE

## 2023-02-25 PROCEDURE — 0651 HSPC ROUTINE HOME CARE

## 2023-02-26 ENCOUNTER — HOME CARE VISIT (OUTPATIENT)
Age: 33
End: 2023-02-26
Payer: COMMERCIAL

## 2023-02-26 PROCEDURE — 0651 HSPC ROUTINE HOME CARE

## 2023-02-27 PROCEDURE — 0651 HSPC ROUTINE HOME CARE

## 2023-02-28 ENCOUNTER — HOME CARE VISIT (OUTPATIENT)
Age: 33
End: 2023-02-28
Payer: COMMERCIAL

## 2023-02-28 VITALS
HEART RATE: 99 BPM | RESPIRATION RATE: 18 BRPM | OXYGEN SATURATION: 96 % | DIASTOLIC BLOOD PRESSURE: 81 MMHG | SYSTOLIC BLOOD PRESSURE: 161 MMHG | TEMPERATURE: 99.2 F

## 2023-02-28 PROCEDURE — G0299 HHS/HOSPICE OF RN EA 15 MIN: HCPCS

## 2023-02-28 PROCEDURE — 0651 HSPC ROUTINE HOME CARE

## 2023-02-28 ASSESSMENT — ENCOUNTER SYMPTOMS
BOWEL INCONTINENCE: 1
COUGH: 1
COUGH CHARACTERISTICS: NON-PRODUCTIVE

## 2023-03-01 ENCOUNTER — HOME CARE VISIT (OUTPATIENT)
Age: 33
End: 2023-03-01
Payer: COMMERCIAL

## 2023-03-01 PROCEDURE — 0651 HSPC ROUTINE HOME CARE

## 2023-03-02 ENCOUNTER — HOME CARE VISIT (OUTPATIENT)
Age: 33
End: 2023-03-02
Payer: COMMERCIAL

## 2023-03-02 PROCEDURE — 0651 HSPC ROUTINE HOME CARE

## 2023-03-02 PROCEDURE — G0299 HHS/HOSPICE OF RN EA 15 MIN: HCPCS

## 2023-03-02 PROCEDURE — 2500000001 HSPC NON INJECTABLE MED

## 2023-03-03 PROCEDURE — 0651 HSPC ROUTINE HOME CARE

## 2023-03-03 ASSESSMENT — ENCOUNTER SYMPTOMS
BOWEL INCONTINENCE: 1
COUGH CHARACTERISTICS: NON-PRODUCTIVE
COUGH: 1

## 2023-03-03 NOTE — HOSPICE
Medication refills ordered this visit: gabapentin, lorazepam, loratidine, flonase #[id number]    Medications reconciled and all medications are available in the home this visit. Medications are effective at this time. Supplies by type and quantity ordered this visit include: none requested    Instructed caregiver on 24-hour hospice availability and phone number.     Plan for next visit: Monday, 3/6/2023

## 2023-03-04 ENCOUNTER — HOME CARE VISIT (OUTPATIENT)
Age: 33
End: 2023-03-04
Payer: COMMERCIAL

## 2023-03-04 PROCEDURE — 0651 HSPC ROUTINE HOME CARE

## 2023-03-05 PROCEDURE — 0651 HSPC ROUTINE HOME CARE

## 2023-03-06 ENCOUNTER — TELEPHONE (OUTPATIENT)
Age: 33
End: 2023-03-06

## 2023-03-06 NOTE — TELEPHONE ENCOUNTER
I received a call over the weekend that Mr. Caprice Dunaway passed away. The death certificate will be assigned to his PCP.     Dr. Douglas Duckworth  [facility], [address] [place] [address], [address] [place] [address] Str.  Phone: [phone]  Fax: [phone]

## 2023-03-06 NOTE — HOSPICE
Postmortem care provided to patient by facility staff. Patient and families postmortem Shinto and cultural wishes maintained. Eyes gently closed. Soft rolled towel placed under patient chin. HOB raised to 30 degrees. Encouraged family/caregiver to spend time with  patient. Provided emotional support. This clinician did stay with family/caregiver until clergy or  home arrived. Family/Caregiver is appropriately coping/grieving currently. [address]  picked up remains.     Meds destroyed-  Morphine 15ml  Ativan 10ml    [facility] # [id number]

## 2023-03-07 ENCOUNTER — TELEPHONE (OUTPATIENT)
Age: 33
End: 2023-03-07

## 2023-03-07 NOTE — TELEPHONE ENCOUNTER
Spoke with Dr. Gisselle Hong, medical examiner, in reference to cause of death for patient. 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 He will corrected the certificate and submit as such.
